# Patient Record
Sex: MALE | Race: OTHER | HISPANIC OR LATINO | Employment: OTHER | URBAN - METROPOLITAN AREA
[De-identification: names, ages, dates, MRNs, and addresses within clinical notes are randomized per-mention and may not be internally consistent; named-entity substitution may affect disease eponyms.]

---

## 2022-04-10 ENCOUNTER — HISTORICAL (OUTPATIENT)
Dept: ADMINISTRATIVE | Facility: HOSPITAL | Age: 22
End: 2022-04-10

## 2022-04-25 VITALS
WEIGHT: 315 LBS | SYSTOLIC BLOOD PRESSURE: 146 MMHG | DIASTOLIC BLOOD PRESSURE: 80 MMHG | BODY MASS INDEX: 41.75 KG/M2 | HEIGHT: 73 IN

## 2022-08-01 VITALS
SYSTOLIC BLOOD PRESSURE: 152 MMHG | RESPIRATION RATE: 20 BRPM | OXYGEN SATURATION: 96 % | DIASTOLIC BLOOD PRESSURE: 88 MMHG | BODY MASS INDEX: 34.65 KG/M2 | WEIGHT: 270 LBS | HEIGHT: 74 IN | HEART RATE: 105 BPM

## 2022-08-01 PROCEDURE — 93005 ELECTROCARDIOGRAM TRACING: CPT

## 2022-08-01 PROCEDURE — 99285 EMERGENCY DEPT VISIT HI MDM: CPT | Mod: 25

## 2022-08-01 RX ORDER — NAPROXEN SODIUM 220 MG/1
324 TABLET, FILM COATED ORAL
Status: COMPLETED | OUTPATIENT
Start: 2022-08-02 | End: 2022-08-02

## 2022-08-02 ENCOUNTER — HOSPITAL ENCOUNTER (EMERGENCY)
Facility: HOSPITAL | Age: 22
Discharge: HOME | End: 2022-08-02
Attending: EMERGENCY MEDICINE
Payer: MEDICAID

## 2022-08-02 DIAGNOSIS — E86.0 DEHYDRATION: Primary | ICD-10-CM

## 2022-08-02 DIAGNOSIS — R55 NEAR SYNCOPE: ICD-10-CM

## 2022-08-02 DIAGNOSIS — R42 LIGHTHEADEDNESS: ICD-10-CM

## 2022-08-02 DIAGNOSIS — R07.9 CHEST PAIN: ICD-10-CM

## 2022-08-02 DIAGNOSIS — I10 HYPERTENSION, UNSPECIFIED TYPE: ICD-10-CM

## 2022-08-02 LAB
ALBUMIN SERPL-MCNC: 4.3 GM/DL (ref 3.5–5)
ALBUMIN/GLOB SERPL: 1.1 RATIO (ref 1.1–2)
ALP SERPL-CCNC: 137 UNIT/L (ref 40–150)
ALT SERPL-CCNC: 154 UNIT/L (ref 0–55)
AST SERPL-CCNC: 60 UNIT/L (ref 5–34)
BASOPHILS # BLD AUTO: 0.06 X10(3)/MCL (ref 0–0.2)
BASOPHILS NFR BLD AUTO: 0.5 %
BILIRUBIN DIRECT+TOT PNL SERPL-MCNC: 0.8 MG/DL
BNP BLD-MCNC: 10.4 PG/ML
BUN SERPL-MCNC: 13 MG/DL (ref 8.9–20.6)
CALCIUM SERPL-MCNC: 9.5 MG/DL (ref 8.4–10.2)
CHLORIDE SERPL-SCNC: 103 MMOL/L (ref 98–107)
CO2 SERPL-SCNC: 22 MMOL/L (ref 22–29)
CREAT SERPL-MCNC: 1.02 MG/DL (ref 0.73–1.18)
EOSINOPHIL # BLD AUTO: 0.1 X10(3)/MCL (ref 0–0.9)
EOSINOPHIL NFR BLD AUTO: 0.9 %
ERYTHROCYTE [DISTWIDTH] IN BLOOD BY AUTOMATED COUNT: 12.8 % (ref 11.5–17)
GLOBULIN SER-MCNC: 3.9 GM/DL (ref 2.4–3.5)
GLUCOSE SERPL-MCNC: 406 MG/DL (ref 74–100)
HCT VFR BLD AUTO: 46.8 % (ref 42–52)
HGB BLD-MCNC: 15.8 GM/DL (ref 14–18)
IMM GRANULOCYTES # BLD AUTO: 0.05 X10(3)/MCL (ref 0–0.04)
IMM GRANULOCYTES NFR BLD AUTO: 0.5 %
INR BLD: 1.1 (ref 0–1.3)
LYMPHOCYTES # BLD AUTO: 3.41 X10(3)/MCL (ref 0.6–4.6)
LYMPHOCYTES NFR BLD AUTO: 30.7 %
MCH RBC QN AUTO: 28.5 PG (ref 27–31)
MCHC RBC AUTO-ENTMCNC: 33.8 MG/DL (ref 33–36)
MCV RBC AUTO: 84.5 FL (ref 80–94)
MONOCYTES # BLD AUTO: 0.58 X10(3)/MCL (ref 0.1–1.3)
MONOCYTES NFR BLD AUTO: 5.2 %
NEUTROPHILS # BLD AUTO: 6.9 X10(3)/MCL (ref 2.1–9.2)
NEUTROPHILS NFR BLD AUTO: 62.2 %
NRBC BLD AUTO-RTO: 0 %
PLATELET # BLD AUTO: 239 X10(3)/MCL (ref 130–400)
PMV BLD AUTO: 11.8 FL (ref 7.4–10.4)
POCT GLUCOSE: 328 MG/DL (ref 70–110)
POTASSIUM SERPL-SCNC: 3.7 MMOL/L (ref 3.5–5.1)
PROT SERPL-MCNC: 8.2 GM/DL (ref 6.4–8.3)
PROTHROMBIN TIME: 14.1 SECONDS (ref 12.5–14.5)
RBC # BLD AUTO: 5.54 X10(6)/MCL (ref 4.7–6.1)
SODIUM SERPL-SCNC: 137 MMOL/L (ref 136–145)
TROPONIN I SERPL-MCNC: <0.01 NG/ML (ref 0–0.04)
WBC # SPEC AUTO: 11.1 X10(3)/MCL (ref 4.5–11.5)

## 2022-08-02 PROCEDURE — 25000003 PHARM REV CODE 250: Performed by: EMERGENCY MEDICINE

## 2022-08-02 PROCEDURE — 85025 COMPLETE CBC W/AUTO DIFF WBC: CPT | Performed by: EMERGENCY MEDICINE

## 2022-08-02 PROCEDURE — 36415 COLL VENOUS BLD VENIPUNCTURE: CPT | Performed by: EMERGENCY MEDICINE

## 2022-08-02 PROCEDURE — 85610 PROTHROMBIN TIME: CPT | Performed by: EMERGENCY MEDICINE

## 2022-08-02 PROCEDURE — 80053 COMPREHEN METABOLIC PANEL: CPT | Performed by: EMERGENCY MEDICINE

## 2022-08-02 PROCEDURE — 83880 ASSAY OF NATRIURETIC PEPTIDE: CPT | Performed by: EMERGENCY MEDICINE

## 2022-08-02 PROCEDURE — 84484 ASSAY OF TROPONIN QUANT: CPT | Performed by: EMERGENCY MEDICINE

## 2022-08-02 RX ORDER — INSULIN PUMP SYRINGE, 3 ML
EACH MISCELLANEOUS
Qty: 1 EACH | Refills: 0 | Status: SHIPPED | OUTPATIENT
Start: 2022-08-02 | End: 2023-08-02

## 2022-08-02 RX ORDER — LANCETS
EACH MISCELLANEOUS
Qty: 100 EACH | Refills: 0 | Status: SHIPPED | OUTPATIENT
Start: 2022-08-02

## 2022-08-02 RX ORDER — METFORMIN HYDROCHLORIDE 500 MG/1
1000 TABLET ORAL 2 TIMES DAILY WITH MEALS
Qty: 120 TABLET | Refills: 0 | Status: SHIPPED | OUTPATIENT
Start: 2022-08-02 | End: 2022-09-01

## 2022-08-02 RX ADMIN — SODIUM CHLORIDE 1000 ML: 9 INJECTION, SOLUTION INTRAVENOUS at 02:08

## 2022-08-02 RX ADMIN — Medication 324 MG: at 12:08

## 2022-08-02 NOTE — ED NOTES
Pt arrived aasi with c/o dizziness. Pt states he was putting a clear coat on a table and was not wearing a mask. Pt started moving chicken food and became nauseated. Hx of DM and seizures. Pt currently cbg 328. nad noted. Aaox4.

## 2022-08-02 NOTE — ED PROVIDER NOTES
Encounter Date: 8/1/2022       History     Chief Complaint   Patient presents with    Dizziness     Pt arrives via [facility], EMS reports that the pt was clear coating a table without a mask, pt started moving chicken feed and felt dizzy. Pt nose began to bleed and he became nauseated. Pt reports that he has a hx of DM and seizures, pt does not take the medications that he is rx'd for these issues. Pt is hyperglycemic.      21 yo  M with h/o DM here for evaluation of weakness and syncope. He was working outside, had a beer and bent over when he suddenly felt weak, near syncopal and lightheaded.   No chest pain or shortness of breath. Has had this occur numerous times itn eh past. Is on metformin, unclear compliance, does not check cbg, last a1c 4 months ago. Believes he is on 500 bid metformin but having polydyspia and polyuria. No family history of sudden cardiac death or dysrhythmia    The history is provided by the patient. No  was used.   Dizziness  This is a recurrent problem. The current episode started less than 1 hour ago. The problem occurs rarely. The problem has been resolved. Pertinent negatives include no chest pain, no abdominal pain and no shortness of breath. Nothing aggravates the symptoms. Nothing relieves the symptoms. He has tried rest for the symptoms. The treatment provided significant relief.     Review of patient's allergies indicates:  No Known Allergies  No past medical history on file.  No past surgical history on file.  No family history on file.     Review of Systems   Constitutional: Positive for fatigue. Negative for activity change, diaphoresis and fever.   HENT: Negative for congestion, postnasal drip, rhinorrhea, sinus pain, sneezing and sore throat.    Respiratory: Negative for cough, chest tightness, shortness of breath and wheezing.    Cardiovascular: Negative for chest pain, palpitations and leg swelling.   Gastrointestinal: Negative for abdominal  distention, abdominal pain and blood in stool.   Endocrine: Positive for polydipsia and polyuria.   Genitourinary: Negative for decreased urine volume, difficulty urinating and dysuria.   Musculoskeletal: Negative.    Skin: Negative for color change and pallor.   Neurological: Positive for dizziness, syncope and weakness. Negative for speech difficulty, light-headedness and numbness.   All other systems reviewed and are negative.      Physical Exam     Initial Vitals [08/01/22 2349]   BP Pulse Resp Temp SpO2   (!) 152/88 105 20 -- 96 %      MAP       --         Physical Exam    Nursing note and vitals reviewed.  Constitutional: He appears well-developed and well-nourished. He is not diaphoretic. No distress.   HENT:   Head: Normocephalic and atraumatic.   Nose: Nose normal.   Mouth/Throat: Oropharynx is clear and moist.   Eyes: Conjunctivae and EOM are normal. Pupils are equal, round, and reactive to light.   Neck: Trachea normal. Neck supple.   Normal range of motion.  Cardiovascular: Normal rate, regular rhythm, normal heart sounds and intact distal pulses. Exam reveals no gallop and no friction rub.    No murmur heard.  Pulmonary/Chest: Breath sounds normal. No respiratory distress. He has no wheezes. He has no rhonchi. He has no rales. He exhibits no tenderness.   Abdominal: Abdomen is soft. Bowel sounds are normal. He exhibits no distension and no mass. There is no abdominal tenderness. There is no rebound.   Musculoskeletal:         General: No tenderness or edema. Normal range of motion.      Cervical back: Normal range of motion and neck supple.      Lumbar back: Normal. No tenderness. Normal range of motion.     Neurological: He is alert and oriented to person, place, and time. He has normal strength. No cranial nerve deficit or sensory deficit.   Skin: Skin is warm and dry. Capillary refill takes less than 2 seconds. No rash and no abscess noted. No erythema. No pallor.   Psychiatric: He has a normal mood  and affect. His behavior is normal. Judgment and thought content normal.         ED Course   Procedures  Labs Reviewed   COMPREHENSIVE METABOLIC PANEL - Abnormal; Notable for the following components:       Result Value    Glucose Level 406 (*)     Globulin 3.9 (*)     Alanine Aminotransferase 154 (*)     Aspartate Aminotransferase 60 (*)     All other components within normal limits   CBC WITH DIFFERENTIAL - Abnormal; Notable for the following components:    MPV 11.8 (*)     IG# 0.05 (*)     All other components within normal limits   POCT GLUCOSE - Abnormal; Notable for the following components:    POCT Glucose 328 (*)     All other components within normal limits   B-TYPE NATRIURETIC PEPTIDE - Normal   TROPONIN I - Normal   PROTIME-INR - Normal   CBC W/ AUTO DIFFERENTIAL    Narrative:     The following orders were created for panel order CBC auto differential.  Procedure                               Abnormality         Status                     ---------                               -----------         ------                     CBC with Differential[[id number]]        Abnormal            Final result                 Please view results for these tests on the individual orders.   POCT GLUCOSE, HAND-HELD DEVICE     EKG Readings: (Independently Interpreted)   Rhythm: Sinus Tachycardia. Heart Rate: 104. Ectopy: No Ectopy. Conduction: Normal. ST Segments: Normal ST Segments. T Waves: Normal.   2350 8/1/22  Sinus tachycardia with poor r wave progression       Imaging Results          X-Ray Chest 1 View (Preliminary result)  Result time 08/02/22 03:23:47    ED Interpretation by Samantha Gupta MD (08/02/22 03:23:47, [facility] - Emergency Dept, Emergency Medicine)    No acute cardiopulmonary abnormality noted                               Medications   sodium chloride 0.9% bolus 1,000 mL (1,000 mLs Intravenous New Bag 8/2/22 0245)   aspirin chewable tablet 324 mg (324 mg Oral Given 8/2/22 0000)      Medical Decision Making:   History:   Old Medical Records: I decided to obtain old medical records.  Old Records Summarized: records from previous admission(s).       <> Summary of Records: Previous a1c during a prior admit was 10  Initial Assessment:   Syncope outside in the heat  Differential Diagnosis:   Hever, hyperglycemia, dehydration, dysrhythmia, ivvd  Independently Interpreted Test(s):   I have ordered and independently interpreted EKG Reading(s) - see prior notes  Clinical Tests:   Lab Tests: Reviewed  Radiological Study: Reviewed  Medical Tests: Reviewed  ED Management:  Clearly glucose conrol is less than idea, given ivf, willi ncrease metformin, instructed on cbg monitoring and clsoe f/u, ensuring adequate hydration  I think arrhythmia is unlikely. EKG shows normal sinus rhythm with no interval abnormalities such as QT prolongation or WPW. There are no findings to suggest Brugada syndrome. Cardiac monitoring in the emergency department reveals no tachycardic or bradycardic dysrhythmia. Hypertrophic cardiomyopathy was considered, but there are no clear historical elements pointing towards this. EKG is not suggestive. The QRS voltage is not extremely large and there are no suggestive Q waves               ED Course as of 08/02/22 0335   Tue Aug 02, 2022   0334 Feeling much better, comfortable with dc [BS]      ED Course User Index  [BS] Samantha Gupta MD             Clinical Impression:   Final diagnoses:  [R07.9] Chest pain  [E86.0] Dehydration (Primary)  [R42] Lightheadedness  [R55] Near syncope  [I10] Hypertension, unspecified type          ED Disposition Condition    Discharge Stable        ED Prescriptions     Medication Sig Dispense Start Date End Date Auth. Provider    metFORMIN (GLUCOPHAGE) 500 MG tablet Take 2 tablets (1,000 mg total) by mouth 2 (two) times daily with meals. 120 tablet 8/2/2022 9/1/2022 Samantha Gupta MD    blood-glucose meter kit To check BG 2 times daily, to use with  insurance preferred meter 1 each 8/2/2022 8/2/2023 Samantha Gupta MD    lancets Misc To check BG 2 times daily, to use with insurance preferred meter 100 each 8/2/2022  Samantha Gupta MD    blood sugar diagnostic Strp To check BG 2 times daily, to use with insurance preferred meter 70 each 8/2/2022  Samantha Gupta MD        Follow-up Information     Follow up With Specialties Details Why Contact Info    your primary care provider  Call today      [facility] - Emergency Dept Emergency Medicine  As needed, If symptoms worsen [address] [facility] [address]  [phone]           Samantha Gupta MD  08/02/22 7359

## 2023-12-14 ENCOUNTER — HOSPITAL ENCOUNTER (EMERGENCY)
Facility: HOSPITAL | Age: 23
Discharge: HOME | End: 2023-12-14
Attending: INTERNAL MEDICINE
Payer: MEDICAID

## 2023-12-14 VITALS
WEIGHT: 308.63 LBS | RESPIRATION RATE: 17 BRPM | HEART RATE: 87 BPM | TEMPERATURE: 99 F | HEIGHT: 74 IN | SYSTOLIC BLOOD PRESSURE: 153 MMHG | BODY MASS INDEX: 39.61 KG/M2 | DIASTOLIC BLOOD PRESSURE: 79 MMHG | OXYGEN SATURATION: 98 %

## 2023-12-14 DIAGNOSIS — K11.20 SIALADENITIS: Primary | ICD-10-CM

## 2023-12-14 LAB
ALBUMIN SERPL-MCNC: 4 G/DL (ref 3.5–5)
ALBUMIN/GLOB SERPL: 1.1 RATIO (ref 1.1–2)
ALP SERPL-CCNC: 108 UNIT/L (ref 40–150)
ALT SERPL-CCNC: 68 UNIT/L (ref 0–55)
AST SERPL-CCNC: 35 UNIT/L (ref 5–34)
BASOPHILS # BLD AUTO: 0.04 X10(3)/MCL
BASOPHILS NFR BLD AUTO: 0.5 %
BILIRUB SERPL-MCNC: 0.6 MG/DL
BUN SERPL-MCNC: 9.9 MG/DL (ref 8.9–20.6)
CALCIUM SERPL-MCNC: 9.2 MG/DL (ref 8.4–10.2)
CHLORIDE SERPL-SCNC: 105 MMOL/L (ref 98–107)
CO2 SERPL-SCNC: 25 MMOL/L (ref 22–29)
CREAT SERPL-MCNC: 0.95 MG/DL (ref 0.73–1.18)
EOSINOPHIL # BLD AUTO: 0.22 X10(3)/MCL (ref 0–0.9)
EOSINOPHIL NFR BLD AUTO: 2.5 %
ERYTHROCYTE [DISTWIDTH] IN BLOOD BY AUTOMATED COUNT: 12.8 % (ref 11.5–17)
GFR SERPLBLD CREATININE-BSD FMLA CKD-EPI: >60 MLS/MIN/1.73/M2
GLOBULIN SER-MCNC: 3.7 GM/DL (ref 2.4–3.5)
GLUCOSE SERPL-MCNC: 124 MG/DL (ref 74–100)
HCT VFR BLD AUTO: 44.8 % (ref 42–52)
HGB BLD-MCNC: 14.6 G/DL (ref 14–18)
IMM GRANULOCYTES # BLD AUTO: 0.01 X10(3)/MCL (ref 0–0.04)
IMM GRANULOCYTES NFR BLD AUTO: 0.1 %
LYMPHOCYTES # BLD AUTO: 2.71 X10(3)/MCL (ref 0.6–4.6)
LYMPHOCYTES NFR BLD AUTO: 31.1 %
MCH RBC QN AUTO: 27.7 PG (ref 27–31)
MCHC RBC AUTO-ENTMCNC: 32.6 G/DL (ref 33–36)
MCV RBC AUTO: 85 FL (ref 80–94)
MONOCYTES # BLD AUTO: 0.8 X10(3)/MCL (ref 0.1–1.3)
MONOCYTES NFR BLD AUTO: 9.2 %
NEUTROPHILS # BLD AUTO: 4.92 X10(3)/MCL (ref 2.1–9.2)
NEUTROPHILS NFR BLD AUTO: 56.6 %
NRBC BLD AUTO-RTO: 0 %
PLATELET # BLD AUTO: 202 X10(3)/MCL (ref 130–400)
PMV BLD AUTO: 10.7 FL (ref 7.4–10.4)
POTASSIUM SERPL-SCNC: 4 MMOL/L (ref 3.5–5.1)
PROT SERPL-MCNC: 7.7 GM/DL (ref 6.4–8.3)
RBC # BLD AUTO: 5.27 X10(6)/MCL (ref 4.7–6.1)
SODIUM SERPL-SCNC: 140 MMOL/L (ref 136–145)
WBC # SPEC AUTO: 8.7 X10(3)/MCL (ref 4.5–11.5)

## 2023-12-14 PROCEDURE — 25500020 PHARM REV CODE 255: Performed by: INTERNAL MEDICINE

## 2023-12-14 PROCEDURE — 80053 COMPREHEN METABOLIC PANEL: CPT | Performed by: INTERNAL MEDICINE

## 2023-12-14 PROCEDURE — 85025 COMPLETE CBC W/AUTO DIFF WBC: CPT | Performed by: INTERNAL MEDICINE

## 2023-12-14 PROCEDURE — 63600175 PHARM REV CODE 636 W HCPCS: Performed by: INTERNAL MEDICINE

## 2023-12-14 PROCEDURE — 99285 EMERGENCY DEPT VISIT HI MDM: CPT | Mod: 25

## 2023-12-14 PROCEDURE — 96374 THER/PROPH/DIAG INJ IV PUSH: CPT

## 2023-12-14 RX ORDER — DICLOFENAC SODIUM 50 MG/1
50 TABLET, DELAYED RELEASE ORAL 2 TIMES DAILY PRN
Qty: 20 TABLET | Refills: 0 | Status: SHIPPED | OUTPATIENT
Start: 2023-12-14

## 2023-12-14 RX ORDER — KETOROLAC TROMETHAMINE 30 MG/ML
15 INJECTION, SOLUTION INTRAMUSCULAR; INTRAVENOUS ONCE
Status: COMPLETED | OUTPATIENT
Start: 2023-12-14 | End: 2023-12-14

## 2023-12-14 RX ORDER — AMOXICILLIN AND CLAVULANATE POTASSIUM 875; 125 MG/1; MG/1
1 TABLET, FILM COATED ORAL 2 TIMES DAILY
Qty: 14 TABLET | Refills: 0 | Status: SHIPPED | OUTPATIENT
Start: 2023-12-14

## 2023-12-14 RX ADMIN — IOHEXOL 100 ML: 350 INJECTION, SOLUTION INTRAVENOUS at 02:12

## 2023-12-14 RX ADMIN — KETOROLAC TROMETHAMINE 15 MG: 30 INJECTION, SOLUTION INTRAMUSCULAR; INTRAVENOUS at 02:12

## 2023-12-14 NOTE — ED PROVIDER NOTES
Encounter Date: 12/14/2023       History     Chief Complaint   Patient presents with    Facial Pain     Pt. States lower left sided pain as well as a painful knot. States his teeth hurt and his face is numb     Presents with left neck area pain radiating to his teeth. Hx of seizures, last event on 12/7/23, states have not filled his prescription.    The history is provided by the patient and a relative.     Review of patient's allergies indicates:  No Known Allergies  Past Medical History:   Diagnosis Date    Mild intermittent asthma, uncomplicated     Seizure      History reviewed. No pertinent surgical history.  History reviewed. No pertinent family history.  Social History     Tobacco Use    Smoking status: Never    Smokeless tobacco: Never   Substance Use Topics    Alcohol use: Not Currently    Drug use: Not Currently     Review of Systems   Constitutional:  Negative for fever.   HENT:  Positive for dental problem. Negative for drooling, sore throat, trouble swallowing and voice change.    Respiratory:  Negative for shortness of breath.    Cardiovascular:  Negative for chest pain.   Gastrointestinal:  Negative for nausea.   Genitourinary:  Negative for dysuria.   Musculoskeletal:  Negative for back pain.   Skin:  Negative for rash.   Neurological:  Positive for numbness and headaches. Negative for weakness.   Hematological:  Does not bruise/bleed easily.   All other systems reviewed and are negative.      Physical Exam     Initial Vitals [12/14/23 0122]   BP Pulse Resp Temp SpO2   (!) 151/88 98 18 98.7 °F (37.1 °C) 98 %      MAP       --         Physical Exam    Nursing note and vitals reviewed.  Constitutional: He appears well-developed and well-nourished. No distress.   HENT:   Head: Normocephalic and atraumatic.   Right Ear: External ear normal.   Left Ear: External ear normal.   Nose: Nose normal.   Mouth/Throat: Oropharynx is clear and moist. No oropharyngeal exudate.   Tenderness among left submandibular  area   Eyes: Conjunctivae and EOM are normal. Pupils are equal, round, and reactive to light.   Neck: Neck supple. No JVD present.   Normal range of motion.  Cardiovascular:  Regular rhythm, normal heart sounds and intact distal pulses.           Pulmonary/Chest: Breath sounds normal. No respiratory distress.   Abdominal: Abdomen is soft. Bowel sounds are normal. He exhibits no distension. There is no abdominal tenderness. There is no rebound and no guarding.   Musculoskeletal:         General: No edema. Normal range of motion.      Cervical back: Normal range of motion and neck supple.     Neurological: He is alert and oriented to person, place, and time. He has normal strength. GCS score is 15. GCS eye subscore is 4. GCS verbal subscore is 5. GCS motor subscore is 6.   Skin: Skin is warm and dry. No rash noted.   Psychiatric: His behavior is normal.         ED Course   Procedures  Labs Reviewed   COMPREHENSIVE METABOLIC PANEL - Abnormal; Notable for the following components:       Result Value    Glucose Level 124 (*)     Globulin 3.7 (*)     Alanine Aminotransferase 68 (*)     Aspartate Aminotransferase 35 (*)     All other components within normal limits   CBC WITH DIFFERENTIAL - Abnormal; Notable for the following components:    MCHC 32.6 (*)     MPV 10.7 (*)     All other components within normal limits   CBC W/ AUTO DIFFERENTIAL    Narrative:     The following orders were created for panel order CBC auto differential.  Procedure                               Abnormality         Status                     ---------                               -----------         ------                     CBC with Differential[[id number]]        Abnormal            Final result                 Please view results for these tests on the individual orders.   EXTRA TUBES    Narrative:     The following orders were created for panel order EXTRA TUBES.  Procedure                               Abnormality         Status                      ---------                               -----------         ------                     Gold Top Hold[[id number]]                                                                 Please view results for these tests on the individual orders.   GOLD TOP HOLD          Imaging Results              CT Soft Tissue Neck With Contrast (Preliminary result)  Result time 12/14/23 03:39:15      Preliminary result by aLvon Giang Jr., MD (12/14/23 03:39:15)                   Narrative:    START OF REPORT:  Technique: CT of the soft tissues of the neck was performed with intravenous contrast with direct axial as well as sagittal and coronal reformations.    Comparison: None.    Clinical history: Facial Pain (Pt. States lower left sided pain as well as a painful knot. States his teeth hurt and his face is numb.    Findings:  Sinuses: The visualized paranasal sinuses are clear.  Nasopharynx: Unremarkable.  Oropharynx: Unremarkable.  Larynx: Unremarkable.  Trachea: Unremarkable.  Esophagus: Unremarkable.  Vascular structures: Unremarkable.  Thyroid glands: Unremarkable. Bilateral parotid and right submandibular glands appear unremarkable. No evidence of duct dilatation or sialolith. The left submandibular gland appears mildly edematous. There is fat stranding surrounding the left submandibular gland (series 3, image 50) and an element of sialadenitis cannot be excluded.  Lymph nodes: There are multiple enlarged left cervical lymph nodes at level I, II, III and IV, the largest measures approximately 2.5 cm in least dimension at level I (series 3, image 45).    Bony structures: The osseous structures appear unremarkable.      Impression:  1. There are multiple enlarged left cervical lymph nodes at level I, II, III and IV, the largest measures approximately 2.5 cm in least dimension at level I (series 3, image 45).  2. The left submandibular gland appears mildly edematous. There is fat stranding surrounding the left  submandibular gland (series 3, image 50) and an element of sialadenitis cannot be excluded.  3. Details and other findings as described above.                                         Medications   ketorolac injection 15 mg (15 mg Intravenous Given 12/14/23 0213)   iohexoL (OMNIPAQUE 350) injection 100 mL (100 mLs Intravenous Given 12/14/23 0259)     Medical Decision Making  Amount and/or Complexity of Data Reviewed  Labs: ordered. Decision-making details documented in ED Course.  Radiology: ordered and independent interpretation performed. Decision-making details documented in ED Course.    Risk  Prescription drug management.      Additional MDM:   Differential Diagnosis:   URI, COVID, Flu, Strep Throat, Mononucleosis, Viral pharyngitis, HIV, Malignancy, epiglottitis, peritonsillar abscess, MI, among others                                      Clinical Impression:  Final diagnoses:  [K11.20] Sialadenitis (Primary)          ED Disposition Condition    Discharge Stable          ED Prescriptions       Medication Sig Dispense Start Date End Date Auth. Provider    amoxicillin-clavulanate 875-125mg (AUGMENTIN) 875-125 mg per tablet Take 1 tablet by mouth 2 (two) times daily. 14 tablet 12/14/2023 -- Ward Hurley MD    diclofenac (VOLTAREN) 50 MG EC tablet Take 1 tablet (50 mg total) by mouth 2 (two) times daily as needed (Pain). 20 tablet 12/14/2023 -- Ward Hurley MD          Follow-up Information       Follow up With Specialties Details Why Contact Info    [facility] - Emergency Dept Emergency Medicine  If symptoms worsen [facility] [address] [phone]    [facility]  Schedule an appointment as soon as possible for a visit in 2 months  [facility] [address]             Ward Hurley MD  12/14/23 3306

## 2025-03-13 ENCOUNTER — HOSPITAL ENCOUNTER (EMERGENCY)
Facility: HOSPITAL | Age: 25
Discharge: HOME | End: 2025-03-13
Attending: INTERNAL MEDICINE
Payer: MEDICAID

## 2025-03-13 VITALS
HEART RATE: 87 BPM | HEIGHT: 74 IN | BODY MASS INDEX: 34.65 KG/M2 | OXYGEN SATURATION: 97 % | RESPIRATION RATE: 20 BRPM | SYSTOLIC BLOOD PRESSURE: 134 MMHG | WEIGHT: 270 LBS | DIASTOLIC BLOOD PRESSURE: 62 MMHG | TEMPERATURE: 98 F

## 2025-03-13 DIAGNOSIS — R56.9 SEIZURE: Primary | ICD-10-CM

## 2025-03-13 DIAGNOSIS — I10 HYPERTENSION, UNSPECIFIED TYPE: ICD-10-CM

## 2025-03-13 DIAGNOSIS — E11.65 UNCONTROLLED TYPE 2 DIABETES MELLITUS WITH HYPERGLYCEMIA: ICD-10-CM

## 2025-03-13 LAB
ALBUMIN SERPL-MCNC: 4.2 G/DL (ref 3.5–5)
ALBUMIN/GLOB SERPL: 1 RATIO (ref 1.1–2)
ALLENS TEST: ABNORMAL
ALP SERPL-CCNC: 121 UNIT/L (ref 40–150)
ALT SERPL-CCNC: 66 UNIT/L (ref 0–55)
ANION GAP SERPL CALC-SCNC: 20 MEQ/L
AST SERPL-CCNC: 32 UNIT/L (ref 5–34)
BASOPHILS # BLD AUTO: 0.1 X10(3)/MCL
BASOPHILS NFR BLD AUTO: 0.6 %
BILIRUB SERPL-MCNC: 0.7 MG/DL
BUN SERPL-MCNC: 15 MG/DL (ref 8.9–20.6)
CALCIUM SERPL-MCNC: 9.3 MG/DL (ref 8.4–10.2)
CHLORIDE SERPL-SCNC: 101 MMOL/L (ref 98–107)
CO2 SERPL-SCNC: 12 MMOL/L (ref 22–29)
CREAT SERPL-MCNC: 1.15 MG/DL (ref 0.72–1.25)
CREAT/UREA NIT SERPL: 13
DELSYS: ABNORMAL
EOSINOPHIL # BLD AUTO: 0.4 X10(3)/MCL (ref 0–0.9)
EOSINOPHIL NFR BLD AUTO: 2.5 %
ERYTHROCYTE [DISTWIDTH] IN BLOOD BY AUTOMATED COUNT: 12.6 % (ref 11.5–17)
ETHANOL SERPL-MCNC: <10 MG/DL
GFR SERPLBLD CREATININE-BSD FMLA CKD-EPI: >60 ML/MIN/1.73/M2
GLOBULIN SER-MCNC: 4.1 GM/DL (ref 2.4–3.5)
GLUCOSE SERPL-MCNC: 425 MG/DL (ref 74–100)
HCO3 UR-SCNC: 20.6 MMOL/L (ref 24–28)
HCT VFR BLD AUTO: 49 % (ref 42–52)
HGB BLD-MCNC: 16.6 G/DL (ref 14–18)
IMM GRANULOCYTES # BLD AUTO: 0.07 X10(3)/MCL (ref 0–0.04)
IMM GRANULOCYTES NFR BLD AUTO: 0.4 %
LYMPHOCYTES # BLD AUTO: 6.7 X10(3)/MCL (ref 0.6–4.6)
LYMPHOCYTES NFR BLD AUTO: 41.1 %
MCH RBC QN AUTO: 28.7 PG (ref 27–31)
MCHC RBC AUTO-ENTMCNC: 33.9 G/DL (ref 33–36)
MCV RBC AUTO: 84.6 FL (ref 80–94)
MONOCYTES # BLD AUTO: 0.87 X10(3)/MCL (ref 0.1–1.3)
MONOCYTES NFR BLD AUTO: 5.3 %
NEUTROPHILS # BLD AUTO: 8.15 X10(3)/MCL (ref 2.1–9.2)
NEUTROPHILS NFR BLD AUTO: 50.1 %
NRBC BLD AUTO-RTO: 0 %
PCO2 BLDA: 37.8 MMHG (ref 35–45)
PH SMN: 7.34 [PH] (ref 7.35–7.45)
PLATELET # BLD AUTO: 360 X10(3)/MCL (ref 130–400)
PMV BLD AUTO: 10.9 FL (ref 7.4–10.4)
PO2 BLDA: 84 MMHG (ref 80–100)
POC BE: -5 MMOL/L
POC SATURATED O2: 96 % (ref 95–100)
POC TCO2: 22 MMOL/L (ref 23–27)
POCT GLUCOSE: 349 MG/DL (ref 70–110)
POCT GLUCOSE: 368 MG/DL (ref 70–110)
POCT GLUCOSE: 401 MG/DL (ref 70–110)
POTASSIUM SERPL-SCNC: 4.1 MMOL/L (ref 3.5–5.1)
PROT SERPL-MCNC: 8.3 GM/DL (ref 6.4–8.3)
RBC # BLD AUTO: 5.79 X10(6)/MCL (ref 4.7–6.1)
SAMPLE: ABNORMAL
SITE: ABNORMAL
SODIUM SERPL-SCNC: 133 MMOL/L (ref 136–145)
WBC # BLD AUTO: 16.29 X10(3)/MCL (ref 4.5–11.5)

## 2025-03-13 PROCEDURE — 96360 HYDRATION IV INFUSION INIT: CPT | Mod: 59

## 2025-03-13 PROCEDURE — 99291 CRITICAL CARE FIRST HOUR: CPT

## 2025-03-13 PROCEDURE — 80053 COMPREHEN METABOLIC PANEL: CPT | Performed by: INTERNAL MEDICINE

## 2025-03-13 PROCEDURE — 82803 BLOOD GASES ANY COMBINATION: CPT

## 2025-03-13 PROCEDURE — 63600175 PHARM REV CODE 636 W HCPCS: Performed by: INTERNAL MEDICINE

## 2025-03-13 PROCEDURE — 99900035 HC TECH TIME PER 15 MIN (STAT)

## 2025-03-13 PROCEDURE — 25000003 PHARM REV CODE 250: Performed by: INTERNAL MEDICINE

## 2025-03-13 PROCEDURE — 82077 ASSAY SPEC XCP UR&BREATH IA: CPT | Performed by: INTERNAL MEDICINE

## 2025-03-13 PROCEDURE — 85025 COMPLETE CBC W/AUTO DIFF WBC: CPT | Performed by: INTERNAL MEDICINE

## 2025-03-13 PROCEDURE — 96374 THER/PROPH/DIAG INJ IV PUSH: CPT

## 2025-03-13 PROCEDURE — 96372 THER/PROPH/DIAG INJ SC/IM: CPT | Performed by: INTERNAL MEDICINE

## 2025-03-13 PROCEDURE — 96361 HYDRATE IV INFUSION ADD-ON: CPT

## 2025-03-13 PROCEDURE — 36600 WITHDRAWAL OF ARTERIAL BLOOD: CPT

## 2025-03-13 PROCEDURE — 82962 GLUCOSE BLOOD TEST: CPT

## 2025-03-13 RX ORDER — SODIUM CHLORIDE 9 MG/ML
1000 INJECTION, SOLUTION INTRAVENOUS
Status: COMPLETED | OUTPATIENT
Start: 2025-03-13 | End: 2025-03-13

## 2025-03-13 RX ORDER — LEVETIRACETAM 500 MG/5ML
1500 INJECTION, SOLUTION, CONCENTRATE INTRAVENOUS
Status: COMPLETED | OUTPATIENT
Start: 2025-03-13 | End: 2025-03-13

## 2025-03-13 RX ORDER — ACETAMINOPHEN 500 MG
1000 TABLET ORAL
Status: COMPLETED | OUTPATIENT
Start: 2025-03-13 | End: 2025-03-13

## 2025-03-13 RX ADMIN — ACETAMINOPHEN 1000 MG: 500 TABLET, FILM COATED ORAL at 09:03

## 2025-03-13 RX ADMIN — SODIUM CHLORIDE 1000 ML: 9 INJECTION, SOLUTION INTRAVENOUS at 09:03

## 2025-03-13 RX ADMIN — HUMAN INSULIN 5 UNITS: 100 INJECTION, SOLUTION SUBCUTANEOUS at 09:03

## 2025-03-13 RX ADMIN — SODIUM CHLORIDE, POTASSIUM CHLORIDE, SODIUM LACTATE AND CALCIUM CHLORIDE 3675 ML: 600; 310; 30; 20 INJECTION, SOLUTION INTRAVENOUS at 10:03

## 2025-03-13 RX ADMIN — LEVETIRACETAM 1500 MG: 100 INJECTION, SOLUTION INTRAVENOUS at 09:03

## 2025-03-13 NOTE — ED PROVIDER NOTES
Encounter Date: 3/13/2025  History from patient and father     History     Chief Complaint   Patient presents with    Shortness of Breath     C/o SOB, doesn't remember exactly what happened     HPI    Alvarez Torres is 24 y.o. male who  has a past medical history of Mild intermittent asthma, uncomplicated and Seizure. arrives in ER with c/o Shortness of Breath (C/o SOB, doesn't remember exactly what happened)    Review of patient's allergies indicates:  No Known Allergies  Past Medical History:   Diagnosis Date    Mild intermittent asthma, uncomplicated     Seizure      History reviewed. No pertinent surgical history.  No family history on file.  Social History[1]  Review of Systems   Constitutional:  Negative for fever.   HENT:  Negative for trouble swallowing and voice change.    Eyes:  Negative for visual disturbance.   Respiratory:  Negative for cough and shortness of breath.    Cardiovascular:  Negative for chest pain.   Gastrointestinal:  Negative for abdominal pain, diarrhea and vomiting.   Genitourinary:  Negative for dysuria and hematuria.   Musculoskeletal:  Negative for back pain and gait problem.   Skin:  Negative for color change and rash.   Neurological:  Positive for seizures. Negative for headaches.   Psychiatric/Behavioral:  Negative for behavioral problems and sleep disturbance.    All other systems reviewed and are negative.      Physical Exam     Initial Vitals [03/13/25 0849]   BP Pulse Resp Temp SpO2   (!) 185/81 (!) 119 20 98 °F (36.7 °C) 96 %      MAP       --         Physical Exam    Nursing note and vitals reviewed.  Constitutional: He appears well-developed and well-nourished. No distress.   HENT:   Head: Atraumatic.   Eyes: EOM are normal.   Neck: Neck supple.   Cardiovascular:  Normal heart sounds.           Tachycardia   Pulmonary/Chest: Breath sounds normal. No respiratory distress. He has no wheezes. He has no rales.   Abdominal: Abdomen is soft. Bowel sounds are normal.    Musculoskeletal:         General: Normal range of motion.      Cervical back: Neck supple. No bony tenderness.     Neurological: He is alert and oriented to person, place, and time. GCS score is 15. GCS eye subscore is 4. GCS verbal subscore is 5. GCS motor subscore is 6.   Speech Normal   Skin: Skin is dry.   Psychiatric: He has a normal mood and affect.   Pleasant         ED Course   Critical Care    Date/Time: 3/13/2025 12:23 PM    Performed by: Antionette Neil MD  Authorized by: Antionette Neil MD  Direct patient critical care time: 35 minutes  Total critical care time (exclusive of procedural time) : 35 minutes  Critical care was necessary to treat or prevent imminent or life-threatening deterioration of the following conditions: metabolic crisis and CNS failure or compromise.  Critical care was time spent personally by me on the following activities: development of treatment plan with patient or surrogate, evaluation of patient's response to treatment, examination of patient, obtaining history from patient or surrogate, ordering and performing treatments and interventions, ordering and review of laboratory studies, ordering and review of radiographic studies, pulse oximetry, re-evaluation of patient's condition and review of old charts.        Orders Placed This Encounter    Critical Care    CT Head Without Contrast    CBC auto differential    Comprehensive metabolic panel    Urinalysis, Reflex to Urine Culture    Ethanol    Drug Screen, Urine    CBC with Differential    POCT ARTERIAL BLOOD GAS Blood Gas    Insert Saline lock IV    POCT glucose    POCT glucose    ISTAT PROCEDURE    POCT glucose    0.9% NaCl infusion    levETIRAcetam injection 1,500 mg    insulin regular injection 5 Units 0.05 mL    acetaminophen tablet 1,000 mg    lactated ringers bolus 3,675 mL     Labs Reviewed   COMPREHENSIVE METABOLIC PANEL - Abnormal       Result Value    Sodium 133 (*)     Potassium 4.1      Chloride 101      CO2  12 (*)     Glucose 425 (*)     Blood Urea Nitrogen 15.0      Creatinine 1.15      Calcium 9.3      Protein Total 8.3      Albumin 4.2      Globulin 4.1 (*)     Albumin/Globulin Ratio 1.0 (*)     Bilirubin Total 0.7            ALT 66 (*)     AST 32      eGFR >60      Anion Gap 20.0      BUN/Creatinine Ratio 13     CBC WITH DIFFERENTIAL - Abnormal    WBC 16.29 (*)     RBC 5.79      Hgb 16.6      Hct 49.0      MCV 84.6      MCH 28.7      MCHC 33.9      RDW 12.6      Platelet 360      MPV 10.9 (*)     Neut % 50.1      Lymph % 41.1      Mono % 5.3      Eos % 2.5      Basophil % 0.6      Imm Grans % 0.4      Neut # 8.15      Lymph # 6.70 (*)     Mono # 0.87      Eos # 0.40      Baso # 0.10      Imm Gran # 0.07 (*)     NRBC% 0.0     POCT GLUCOSE - Abnormal    POCT Glucose 368 (*)    POCT GLUCOSE - Abnormal    POCT Glucose 401 (*)    ISTAT PROCEDURE - Abnormal    POC PH 7.345 (*)     POC PCO2 37.8      POC PO2 84      POC HCO3 20.6 (*)     POC BE -5 (*)     POC SATURATED O2 96      POC TCO2 22 (*)     Sample ARTERIAL      Site RR      Allens Test Pass      DelSys Room Air     POCT GLUCOSE - Abnormal    POCT Glucose 349 (*)    ALCOHOL,MEDICAL (ETHANOL) - Normal    Ethanol Level <10.0     CBC W/ AUTO DIFFERENTIAL    Narrative:     The following orders were created for panel order CBC auto differential.  Procedure                               Abnormality         Status                     ---------                               -----------         ------                     CBC with Differential[[id number]]       Abnormal            Final result                 Please view results for these tests on the individual orders.   URINALYSIS, REFLEX TO URINE CULTURE   DRUG SCREEN, URINE (BEAKER)        Imaging Results              CT Head Without Contrast (Final result)  Result time 03/13/25 10:05:46      Final result by Carlos Isbell MD (03/13/25 10:05:46)                   Impression:      1. No acute intracranial  abnormality identified  2. Findings and other details as above      Electronically signed by: Carlos Isbell  Date:    03/13/2025  Time:    10:05               Narrative:    EXAMINATION:  CT HEAD WITHOUT CONTRAST    CLINICAL HISTORY:  Recurrent seizures;, .    TECHNIQUE:  PATIENT RADIATION DOSE: DLP(mGycm) 941    As per PQRS measures, all CT scans at this facility used dose modulation, iterative reconstruction, and/or weight based dose adjustment when appropriate to reduce radiation dose to as low as reasonably achievable.    COMPARISON:  [facility]    FINDINGS:  Serial axial images were obtained of the head without the administration of IV contrast. Both brain and bone parenchymal windows were obtained.  Additional coronal and sagittal reconstructions were obtained.  Ventricles, cisterns, and sulci are within normal limits.  There is no evidence of intracranial hemorrhage, midline shift, mass effect, or abnormal extra-axial fluid collections.  Cerebellar tonsils extend caudally to the level of foramen magnum.  There is beam hardening artifact at the skull base.  There is mucosal thickening at the right sphenoid sinus.  Mastoid air cells are aerated bilaterally.  Filling defects noted to the left external auditory canal suggesting cerumen.  Clinical correlation is indicated.                                       Medications   0.9% NaCl infusion (1,000 mLs Intravenous New Bag 3/13/25 0911)   levETIRAcetam injection 1,500 mg (1,500 mg Intravenous Given 3/13/25 0914)   insulin regular injection 5 Units 0.05 mL (5 Units Subcutaneous Given 3/13/25 0936)   acetaminophen tablet 1,000 mg (1,000 mg Oral Given 3/13/25 0958)   lactated ringers bolus 3,675 mL (3,675 mLs Intravenous New Bag 3/13/25 1039)     Medical Decision Making    Alvarez Torres is 24 y.o. male who  has a past medical history of Mild intermittent asthma, uncomplicated and Seizure. arrives in ER with c/o Shortness of Breath (C/o SOB, doesn't remember  exactly what happened)    Patient is brought in by his father saying that he started seizing in the car while they were going to work.  Patient's father says that usually he is able to tell you that he is going to get a seizure but today he just started shaking.  So he brought him to the emergency room.  On arrival in the emergency room patient's postictal, but he is able to answer simple questions, he is tachycardic, his lung sounds are clear, O2 sat is 96% on room air.    Looking at his chart patient does not take any medicines for blood pressure or seizures, I searched his chart and found that he has been having seizures since at least 2019 or even before that.  His blood pressure is elevated but I do not see any blood pressure medicines listed for him.  The only medicines listed for him and has been filled is metformin.    Amount and/or Complexity of Data Reviewed  Labs: ordered. Decision-making details documented in ED Course.  Radiology: ordered.    Risk  OTC drugs.  Prescription drug management.               ED Course as of 03/13/25 1223   Thu Mar 13, 2025   1132 POC HCO3(!): 20.6  Patient's initial bicarb on arrival was 12, but now it has a up to 20, so I think that anion gap was more from seizure then DKA, he has not given us a urine sample, but otherwise he is awake alert oriented x3.  His wife came and says that she does take Keppra on a regular basis, and medicine for blood pressure [GQ]   1210 BP: 134/62 [GQ]   1210 Pulse: 87 [GQ]   1210 Resp: 20 [GQ]   1210 SpO2: 97 %  Patient is doing good, his blood pressure has come down, his the anion gap is better, his bicarb has come up to 20 on the blood gas, he does not have acidosis anymore, I think the low bicarb was because of the seizure and not because of the DKA.  He has a still not give him in the urine sample to see his ketones, I am going to let him go home with instruction to see his doctor to adjust his medicines for his diabetes, I will advise him  that he must take his Keppra on a regular basis, although he says that he is taking it, but he does not have any pharmacy records of filling it.  I will prescribe him Keppra and let him go home. [GQ]   1215 Patient has gotten insulin in the emergency room, he has gotten fluid bolus, his blood sugar has come down to 349, [GQ]   1221 Patient says he takes his Keppra only when needed, and I have explained to him that he has to take the Keppra on a regular basis to prevent the seizures there is no way for him to know when he needs Keppra.  But as his father was saying that he usually can tell that he is going to get a seizure.  Also advise him that he must go and talk to his family doctor to adjust his medicines for diabetes.  They see a nurse practitioner in [place] and the wife verbalized understanding and I am going to let them go home. [GQ]      ED Course User Index  [GQ] Antionette Neil MD                     Clinical Impression:  Final diagnoses:  [R56.9] Seizure (Primary)  [I10] Hypertension, unspecified type  [E11.65] Uncontrolled type 2 diabetes mellitus with hyperglycemia          ED Disposition Condition    Discharge Stable          ED Prescriptions    None       Follow-up Information       Follow up With Specialties Details Why Contact Info    PMD  In 2 days                 [1]   Social History  Tobacco Use    Smoking status: Never    Smokeless tobacco: Never   Substance Use Topics    Alcohol use: Not Currently    Drug use: Not Currently        Antionette Neil MD  03/13/25 8026

## 2025-04-11 ENCOUNTER — HOSPITAL ENCOUNTER (EMERGENCY)
Facility: HOSPITAL | Age: 25
Discharge: HOME | End: 2025-04-11
Attending: EMERGENCY MEDICINE
Payer: MEDICAID

## 2025-04-11 VITALS
RESPIRATION RATE: 20 BRPM | HEIGHT: 75 IN | TEMPERATURE: 98 F | DIASTOLIC BLOOD PRESSURE: 93 MMHG | OXYGEN SATURATION: 97 % | HEART RATE: 93 BPM | SYSTOLIC BLOOD PRESSURE: 145 MMHG | BODY MASS INDEX: 31.08 KG/M2 | WEIGHT: 250 LBS

## 2025-04-11 DIAGNOSIS — R56.9 SEIZURE: ICD-10-CM

## 2025-04-11 LAB
ACCEPTIBLE SP GR UR QL: >=1.03 (ref 1–1.03)
ALBUMIN SERPL-MCNC: 4.3 G/DL (ref 3.5–5)
ALBUMIN/GLOB SERPL: 1.2 RATIO (ref 1.1–2)
ALP SERPL-CCNC: 94 UNIT/L (ref 40–150)
ALT SERPL-CCNC: 65 UNIT/L (ref 0–55)
AMPHET UR QL SCN: POSITIVE
ANION GAP SERPL CALC-SCNC: 23 MEQ/L
AST SERPL-CCNC: 28 UNIT/L (ref 11–45)
BACTERIA #/AREA URNS AUTO: ABNORMAL /HPF
BARBITURATE SCN PRESENT UR: NEGATIVE
BASOPHILS # BLD AUTO: 0.08 X10(3)/MCL
BASOPHILS NFR BLD AUTO: 0.5 %
BENZODIAZ UR QL SCN: NEGATIVE
BILIRUB SERPL-MCNC: 0.8 MG/DL
BILIRUB UR QL STRIP.AUTO: NEGATIVE
BUN SERPL-MCNC: 20 MG/DL (ref 8.9–20.6)
CALCIUM SERPL-MCNC: 9.2 MG/DL (ref 8.4–10.2)
CANNABINOIDS UR QL SCN: NEGATIVE
CHLORIDE SERPL-SCNC: 105 MMOL/L (ref 98–107)
CLARITY UR: CLEAR
CO2 SERPL-SCNC: 10 MMOL/L (ref 22–29)
COCAINE UR QL SCN: NEGATIVE
COLOR UR AUTO: YELLOW
CREAT SERPL-MCNC: 1.18 MG/DL (ref 0.72–1.25)
CREAT/UREA NIT SERPL: 17
EOSINOPHIL # BLD AUTO: 0.44 X10(3)/MCL (ref 0–0.9)
EOSINOPHIL NFR BLD AUTO: 3 %
ERYTHROCYTE [DISTWIDTH] IN BLOOD BY AUTOMATED COUNT: 12.6 % (ref 11.5–17)
FENTANYL UR QL SCN: NEGATIVE
GFR SERPLBLD CREATININE-BSD FMLA CKD-EPI: >60 ML/MIN/1.73/M2
GLOBULIN SER-MCNC: 3.7 GM/DL (ref 2.4–3.5)
GLUCOSE SERPL-MCNC: 215 MG/DL (ref 74–100)
GLUCOSE UR QL STRIP: ABNORMAL
HCT VFR BLD AUTO: 48.9 % (ref 42–52)
HGB BLD-MCNC: 16 G/DL (ref 14–18)
HGB UR QL STRIP: ABNORMAL
IMM GRANULOCYTES # BLD AUTO: 0.04 X10(3)/MCL (ref 0–0.04)
IMM GRANULOCYTES NFR BLD AUTO: 0.3 %
KETONES UR QL STRIP: ABNORMAL
LEUKOCYTE ESTERASE UR QL STRIP: NEGATIVE
LYMPHOCYTES # BLD AUTO: 7.37 X10(3)/MCL (ref 0.6–4.6)
LYMPHOCYTES NFR BLD AUTO: 49.4 %
MAGNESIUM SERPL-MCNC: 2.1 MG/DL (ref 1.6–2.6)
MCH RBC QN AUTO: 28.7 PG (ref 27–31)
MCHC RBC AUTO-ENTMCNC: 32.7 G/DL (ref 33–36)
MCV RBC AUTO: 87.8 FL (ref 80–94)
MDMA UR QL SCN: NEGATIVE
MONOCYTES # BLD AUTO: 0.77 X10(3)/MCL (ref 0.1–1.3)
MONOCYTES NFR BLD AUTO: 5.2 %
NEUTROPHILS # BLD AUTO: 6.21 X10(3)/MCL (ref 2.1–9.2)
NEUTROPHILS NFR BLD AUTO: 41.6 %
NITRITE UR QL STRIP: NEGATIVE
NRBC BLD AUTO-RTO: 0 %
OPIATES UR QL SCN: NEGATIVE
PCP UR QL: NEGATIVE
PH UR STRIP: 5.5 [PH]
PH UR: 5.5 [PH] (ref 3–11)
PLATELET # BLD AUTO: 303 X10(3)/MCL (ref 130–400)
PMV BLD AUTO: 11.1 FL (ref 7.4–10.4)
POTASSIUM SERPL-SCNC: 4.3 MMOL/L (ref 3.5–5.1)
PROT SERPL-MCNC: 8 GM/DL (ref 6.4–8.3)
PROT UR QL STRIP: ABNORMAL
RBC # BLD AUTO: 5.57 X10(6)/MCL (ref 4.7–6.1)
RBC #/AREA URNS AUTO: ABNORMAL /HPF
SODIUM SERPL-SCNC: 138 MMOL/L (ref 136–145)
SP GR UR STRIP.AUTO: >=1.03 (ref 1–1.03)
SPERM URNS QL MICRO: ABNORMAL /HPF
SQUAMOUS #/AREA URNS AUTO: ABNORMAL /HPF
TROPONIN I SERPL-MCNC: <0.01 NG/ML (ref 0–0.04)
UROBILINOGEN UR STRIP-ACNC: 0.2
WBC # BLD AUTO: 14.91 X10(3)/MCL (ref 4.5–11.5)
WBC #/AREA URNS AUTO: ABNORMAL /HPF

## 2025-04-11 PROCEDURE — 81003 URINALYSIS AUTO W/O SCOPE: CPT | Performed by: EMERGENCY MEDICINE

## 2025-04-11 PROCEDURE — 80307 DRUG TEST PRSMV CHEM ANLYZR: CPT | Performed by: EMERGENCY MEDICINE

## 2025-04-11 PROCEDURE — 96374 THER/PROPH/DIAG INJ IV PUSH: CPT

## 2025-04-11 PROCEDURE — 84484 ASSAY OF TROPONIN QUANT: CPT | Performed by: EMERGENCY MEDICINE

## 2025-04-11 PROCEDURE — 83735 ASSAY OF MAGNESIUM: CPT | Performed by: EMERGENCY MEDICINE

## 2025-04-11 PROCEDURE — 80053 COMPREHEN METABOLIC PANEL: CPT | Performed by: EMERGENCY MEDICINE

## 2025-04-11 PROCEDURE — 93010 ELECTROCARDIOGRAM REPORT: CPT | Mod: ,,, | Performed by: INTERNAL MEDICINE

## 2025-04-11 PROCEDURE — 85025 COMPLETE CBC W/AUTO DIFF WBC: CPT | Performed by: EMERGENCY MEDICINE

## 2025-04-11 PROCEDURE — 99284 EMERGENCY DEPT VISIT MOD MDM: CPT | Mod: 25

## 2025-04-11 PROCEDURE — 93005 ELECTROCARDIOGRAM TRACING: CPT

## 2025-04-11 PROCEDURE — 63600175 PHARM REV CODE 636 W HCPCS: Performed by: EMERGENCY MEDICINE

## 2025-04-11 RX ORDER — LEVETIRACETAM 500 MG/5ML
1500 INJECTION, SOLUTION, CONCENTRATE INTRAVENOUS
Status: COMPLETED | OUTPATIENT
Start: 2025-04-11 | End: 2025-04-11

## 2025-04-11 RX ADMIN — LEVETIRACETAM 1500 MG: 100 INJECTION, SOLUTION INTRAVENOUS at 08:04

## 2025-04-11 NOTE — DISCHARGE INSTRUCTIONS
It is your responsibility to obtain follow up care as instructed. It is your responsibility to call the clinic, specialist, or primary care physician you are referred to for follow-up care and make an appointment. When you call to make an appointment, advise the  that you were referred by the emergency department.    If you become concerned about your condition and are unable to contact your physician, or if your condition is a medical emergency, return to the emergency department.    If X-rays were taken today, the interpretation in the emergency department is preliminary. If the final radiologist interpretation reveals a different significant finding, you will be contacted. There may be incidental findings that were detected on your imaging studies that do not impact your care in the emergency department. These incidental findings will need to be followed up by your primary care physician.

## 2025-04-11 NOTE — ED PROVIDER NOTES
Encounter Date: 4/11/2025       History     Chief Complaint   Patient presents with    Altered Mental Status    Seizures     Brought POV by co-workers for having a seizure in truck on the way to work, hx of seizures     The patient presents accompanied by his co-worker after having had a seizure.  The patient was leaving [facility] when he suddenly stiffened up and became unresponsive.  Currently he feels back to baseline.  He does not remember the event.  The patient has a history of seizures and takes Keppra 500 mg b.i.d..  He states he has been compliant with his medication.  His last seizure was about 1 month ago when he was seen in his ER.  He also has a history of hypertension and diabetes for which he takes metformin and lisinopril.  No recent illnesses.  He did not strike his head.        Review of patient's allergies indicates:  No Known Allergies  Past Medical History:   Diagnosis Date    Mild intermittent asthma, uncomplicated     Seizure      History reviewed. No pertinent surgical history.  No family history on file.  Social History[1]  Review of Systems   All other systems reviewed and are negative.      Physical Exam     Initial Vitals [04/11/25 0744]   BP Pulse Resp Temp SpO2   (!) 180/112 (!) 118 (!) 22 97.8 °F (36.6 °C) 97 %      MAP       --         Physical Exam    Constitutional:   Vital signs reviewed.  Nursing note reviewed.    General:  The patient is awake and alert, appropriate and interactive.    Eyes: Conjunctiva are clear.  Corneas appear normal.  EOMI.  ENT: Face, head, external nose, and ears are normal-appearing.  The oropharynx appears normal.  The uvula is midline.  The posterior pharyngeal elements are symmetric.  Voice is normal.  Mucous membranes moist.  Neck: The neck is nontender and supple with normal range of motion.  Back: The back appears normal with full range of motion.  Respiratory: The respiratory effort is normal.  The lungs are clear to auscultation.  Cardiovascular:  Heart sounds are normal.  No murmur or rub.  The rate is tachycardic at approximately 112 beats per minute.  The rhythm is normal.  Peripheral pulses are normal and equal bilaterally.  No edema is present.  Capillary refill is less than 3 seconds.  GI: The abdomen is soft, nondistended, without mass.  There is no tenderness to palpation.  No rebound or guarding.  Bowel sounds are normal.  The liver and spleen are nonpalpable.  Musculoskeletal: Range of motion of all joints appears normal without pain or tenderness.  Skin: There is no rash.  Neurologic: No focal deficits are noted.  The mental status is normal.  Cranial nerves are intact.  Motor strength is normal and symmetric bilaterally.  Sensation to light touch is intact and symmetrical.  Deep tendon reflexes are symmetric bilaterally.           ED Course   Procedures  Labs Reviewed   COMPREHENSIVE METABOLIC PANEL - Abnormal       Result Value    Sodium 138      Potassium 4.3      Chloride 105      CO2 10 (*)     Glucose 215 (*)     Blood Urea Nitrogen 20.0      Creatinine 1.18      Calcium 9.2      Protein Total 8.0      Albumin 4.3      Globulin 3.7 (*)     Albumin/Globulin Ratio 1.2      Bilirubin Total 0.8      ALP 94      ALT 65 (*)     AST 28      eGFR >60      Anion Gap 23.0      BUN/Creatinine Ratio 17     URINALYSIS, REFLEX TO URINE CULTURE - Abnormal    Color, UA Yellow      Appearance, UA Clear      Specific Gravity, UA >=1.030      pH, UA 5.5      Protein, UA 3+ (*)     Glucose, UA Trace (*)     Ketones, UA 1+ (*)     Blood, UA Trace-Intact (*)     Bilirubin, UA Negative      Urobilinogen, UA 0.2      Nitrites, UA Negative      Leukocyte Esterase, UA Negative     DRUG SCREEN, URINE (BEAKER) - Abnormal    Amphetamines, Urine Positive (*)     Barbiturates, Urine Negative      Benzodiazepine, Urine Negative      Cannabinoids, Urine Negative      Cocaine, Urine Negative      Fentanyl, Urine Negative      MDMA, Urine Negative      Opiates, Urine Negative       Phencyclidine, Urine Negative      pH, Urine 5.5      Specific Gravity, Urine Auto >=1.030      Narrative:     Cut off concentrations:    Amphetamines - 1000 ng/ml  Barbiturates - 200 ng/ml  Benzodiazepine - 200 ng/ml  Cannabinoids (THC) - 50 ng/ml  Cocaine - 300 ng/ml  Fentanyl - 1.0 ng/ml  MDMA - 500 ng/ml  Opiates - 300 ng/ml   Phencyclidine (PCP) - 25 ng/ml    Specimen submitted for drug analysis and tested for pH and specific gravity in order to evaluate sample integrity. Suspect tampering if specific gravity is <1.003 and/or pH is not within the range of 4.5 - 8.0  False negatives may result form substances such as bleach added to urine.  False positives may result for the presence of a substance with similar chemical structure to the drug or its metabolite.    This test provides only a PRELIMINARY analytical test result. A more specific alternate chemical method must be used in order to obtain a confirmed analytical result. Gas chromatography/mass spectrometry (GC/MS) is the preferred confirmatory method. Other chemical confirmation methods are available. Clinical consideration and professional judgement should be applied to any drug of abuse test result, particularly when preliminary positive results are used.    Positive results will be confirmed only at the physicians request. Unconfirmed screening results are to be used only for medical purposes (treatment).        CBC WITH DIFFERENTIAL - Abnormal    WBC 14.91 (*)     RBC 5.57      Hgb 16.0      Hct 48.9      MCV 87.8      MCH 28.7      MCHC 32.7 (*)     RDW 12.6      Platelet 303      MPV 11.1 (*)     Neut % 41.6      Lymph % 49.4      Mono % 5.2      Eos % 3.0      Basophil % 0.5      Imm Grans % 0.3      Neut # 6.21      Lymph # 7.37 (*)     Mono # 0.77      Eos # 0.44      Baso # 0.08      Imm Gran # 0.04      NRBC% 0.0     URINALYSIS, MICROSCOPIC - Abnormal    Bacteria, UA None Seen      Sperm, UA Few (*)     RBC, UA 0-2      WBC, UA None Seen       Squamous Epithelial Cells, UA None Seen     MAGNESIUM - Normal    Magnesium Level 2.10     TROPONIN I - Normal    Troponin-I <0.010     CBC W/ AUTO DIFFERENTIAL    Narrative:     The following orders were created for panel order CBC auto differential.  Procedure                               Abnormality         Status                     ---------                               -----------         ------                     CBC with Differential[[id number]]       Abnormal            Final result                 Please view results for these tests on the individual orders.     EKG Readings: (Independently Interpreted)   Sinus tachycardia with a rate of 113 beats per minute.  Normal axis.  Normal intervals.  No ST segment or T-wave changes.       Imaging Results    None          Medications   levETIRAcetam injection 1,500 mg (1,500 mg Intravenous Given 4/11/25 0808)     Medical Decision Making  I rechecked the patient twice during his ED stay, most recently at 9:00 a.m..  He is resting comfortably in voices no complaints.  His mental status and neurologic status is at baseline.  His bicarbonate is low but I suspect this is due to his recent seizure.  His glucose is slightly elevated but not at a level that needs any acute intervention.  He is only on Keppra 500 mg b.i.d. and I do not feel that this is an adequate dose.  After discussion with the patient and his spouse, the plan is to double him to 1000 mg twice a day.  He has and appointment with his PCP on Tuesday (4 days) and I have encouraged him to keep his follow up appointment.    Based on the history, physical, and diagnostic studies, I think the patient is at low risk for life-threatening causes of the neurologic symptoms.  I have considered stroke, ICH, myelopathy, radiculopathy, peripheral nerve compression/disorder, metabolic causes, and nonphysiologic causes.  The patient and/or caregiver understand the need to return to ED immediately for any new,  changing, or worsening symptoms.  They are to return to the ED if they can not see a physician to have other concerns.  The patient will pursue further outpatient evaluation with the primary care physician and/or specialist as indicated in the discharge instructions.    Decision to admit/transfer/discharge:  After my evaluation of the patient and interpretation of all relevant information, the decision has been made to discharge the patient.    I independently reviewed and interpreted any laboratory tests, radiographic images, EKGs, rhythm strips, and other diagnostic tests that were obtained during this Emergency Department visit.    Drug/medication management:  Parenteral controlled substances and other dangerous medications, if administered, can be found in the order section of this chart.  I reviewed the patient's home medications and made changes/adjustments as medically indicated.  Any new medications are documented under the prescription section of this chart.    Social determinants of health addressed during this ED visit:  The patient/caregiver knowledge deficit about today's condition and treatment plan was addressed by me through appropriate patient/caregiver education.    Additional verbal discharge instructions were given and discussed with the patient.  I have spoken with the patient and/or caregivers. I have explained the patient's condition, diagnoses and treatment plan based on the information available to me at this time. I have answered the patient's and/or caregiver's questions and addressed any concerns. The patient and/or caregivers have as good an understanding of the patient's diagnosis, condition and treatment plan as can be expected at this point. The vital signs have been stable. The patient's condition is stable and appropriate for discharge from the emergency department.     The patient will pursue further outpatient evaluation with the primary care physician or other designated or  consulting physician as outlined in the discharge instructions. The patient and/or caregivers are agreeable to this plan of care and follow-up instructions have been explained in detail. The patient and/or caregivers have expressed an understanding of the discharge instructions. The patient and/or caregivers are aware that any significant change in condition or worsening of symptoms should prompt an immediate return to this or the closest emergency department or a call to 911.    Amount and/or Complexity of Data Reviewed  Labs: ordered.    Risk  Prescription drug management.                                      Clinical Impression:  Final diagnoses:  [R56.9] Seizure          ED Disposition Condition    Discharge Stable          ED Prescriptions    None       Follow-up Information       Follow up With Specialties Details Why Contact Info        Follow up with your family doctor on Tuesday as scheduled.      Increase your Keppra from 1 pill twice daily to 2 pills twice daily.                 [1]   Social History  Tobacco Use    Smoking status: Never    Smokeless tobacco: Never   Substance Use Topics    Alcohol use: Not Currently    Drug use: Not Currently        Anthony Victor MD  04/11/25 0925

## 2025-04-12 LAB
OHS QRS DURATION: 98 MS
OHS QTC CALCULATION: 469 MS

## 2025-04-21 ENCOUNTER — HOSPITAL ENCOUNTER (EMERGENCY)
Facility: HOSPITAL | Age: 25
Discharge: HOME | End: 2025-04-21
Attending: EMERGENCY MEDICINE
Payer: MEDICAID

## 2025-04-21 VITALS
TEMPERATURE: 98 F | OXYGEN SATURATION: 99 % | SYSTOLIC BLOOD PRESSURE: 129 MMHG | HEART RATE: 67 BPM | WEIGHT: 276 LBS | DIASTOLIC BLOOD PRESSURE: 79 MMHG | HEIGHT: 75 IN | RESPIRATION RATE: 14 BRPM | BODY MASS INDEX: 34.32 KG/M2

## 2025-04-21 DIAGNOSIS — R56.9 SEIZURE: ICD-10-CM

## 2025-04-21 DIAGNOSIS — F15.10 METHAMPHETAMINE ABUSE: Primary | ICD-10-CM

## 2025-04-21 LAB
ACCEPTIBLE SP GR UR QL: 1.01 (ref 1–1.03)
ALBUMIN SERPL-MCNC: 3.8 G/DL (ref 3.5–5)
ALBUMIN/GLOB SERPL: 1.1 RATIO (ref 1.1–2)
ALP SERPL-CCNC: 98 UNIT/L (ref 40–150)
ALT SERPL-CCNC: 42 UNIT/L (ref 0–55)
AMPHET UR QL SCN: POSITIVE
ANION GAP SERPL CALC-SCNC: 13 MEQ/L
AST SERPL-CCNC: 20 UNIT/L (ref 11–45)
BACTERIA #/AREA URNS AUTO: ABNORMAL /HPF
BARBITURATE SCN PRESENT UR: NEGATIVE
BASOPHILS # BLD AUTO: 0.03 X10(3)/MCL
BASOPHILS NFR BLD AUTO: 0.4 %
BENZODIAZ UR QL SCN: NEGATIVE
BILIRUB SERPL-MCNC: 0.4 MG/DL
BILIRUB UR QL STRIP.AUTO: NEGATIVE
BUN SERPL-MCNC: 15 MG/DL (ref 8.9–20.6)
CALCIUM SERPL-MCNC: 8.8 MG/DL (ref 8.4–10.2)
CANNABINOIDS UR QL SCN: NEGATIVE
CHLORIDE SERPL-SCNC: 107 MMOL/L (ref 98–107)
CLARITY UR: CLEAR
CO2 SERPL-SCNC: 19 MMOL/L (ref 22–29)
COCAINE UR QL SCN: NEGATIVE
COLOR UR AUTO: YELLOW
CREAT SERPL-MCNC: 0.88 MG/DL (ref 0.72–1.25)
CREAT/UREA NIT SERPL: 17
EOSINOPHIL # BLD AUTO: 0.24 X10(3)/MCL (ref 0–0.9)
EOSINOPHIL NFR BLD AUTO: 2.9 %
ERYTHROCYTE [DISTWIDTH] IN BLOOD BY AUTOMATED COUNT: 12.5 % (ref 11.5–17)
FENTANYL UR QL SCN: NEGATIVE
GFR SERPLBLD CREATININE-BSD FMLA CKD-EPI: >60 ML/MIN/1.73/M2
GLOBULIN SER-MCNC: 3.4 GM/DL (ref 2.4–3.5)
GLUCOSE SERPL-MCNC: 179 MG/DL (ref 74–100)
GLUCOSE UR QL STRIP: NEGATIVE
HCT VFR BLD AUTO: 45.4 % (ref 42–52)
HGB BLD-MCNC: 15.4 G/DL (ref 14–18)
HGB UR QL STRIP: NEGATIVE
IMM GRANULOCYTES # BLD AUTO: 0.03 X10(3)/MCL (ref 0–0.04)
IMM GRANULOCYTES NFR BLD AUTO: 0.4 %
KETONES UR QL STRIP: NEGATIVE
LEUKOCYTE ESTERASE UR QL STRIP: NEGATIVE
LYMPHOCYTES # BLD AUTO: 1.72 X10(3)/MCL (ref 0.6–4.6)
LYMPHOCYTES NFR BLD AUTO: 20.5 %
MCH RBC QN AUTO: 28.7 PG (ref 27–31)
MCHC RBC AUTO-ENTMCNC: 33.9 G/DL (ref 33–36)
MCV RBC AUTO: 84.7 FL (ref 80–94)
MDMA UR QL SCN: NEGATIVE
MONOCYTES # BLD AUTO: 0.35 X10(3)/MCL (ref 0.1–1.3)
MONOCYTES NFR BLD AUTO: 4.2 %
NEUTROPHILS # BLD AUTO: 6.03 X10(3)/MCL (ref 2.1–9.2)
NEUTROPHILS NFR BLD AUTO: 71.6 %
NITRITE UR QL STRIP: NEGATIVE
NRBC BLD AUTO-RTO: 0 %
OHS QRS DURATION: 102 MS
OHS QTC CALCULATION: 438 MS
OPIATES UR QL SCN: NEGATIVE
PCP UR QL: NEGATIVE
PH UR STRIP: 6 [PH]
PH UR: 6 [PH] (ref 3–11)
PLATELET # BLD AUTO: 209 X10(3)/MCL (ref 130–400)
PMV BLD AUTO: 10.7 FL (ref 7.4–10.4)
POTASSIUM SERPL-SCNC: 3.9 MMOL/L (ref 3.5–5.1)
PROT SERPL-MCNC: 7.2 GM/DL (ref 6.4–8.3)
PROT UR QL STRIP: ABNORMAL
RBC # BLD AUTO: 5.36 X10(6)/MCL (ref 4.7–6.1)
RBC #/AREA URNS AUTO: ABNORMAL /HPF
SODIUM SERPL-SCNC: 139 MMOL/L (ref 136–145)
SP GR UR STRIP.AUTO: 1.01 (ref 1–1.03)
SPERM URNS QL MICRO: ABNORMAL /HPF
SQUAMOUS #/AREA URNS AUTO: ABNORMAL /HPF
UROBILINOGEN UR STRIP-ACNC: 0.2
WBC # BLD AUTO: 8.4 X10(3)/MCL (ref 4.5–11.5)
WBC #/AREA URNS AUTO: ABNORMAL /HPF

## 2025-04-21 PROCEDURE — 80053 COMPREHEN METABOLIC PANEL: CPT | Performed by: EMERGENCY MEDICINE

## 2025-04-21 PROCEDURE — 99284 EMERGENCY DEPT VISIT MOD MDM: CPT | Mod: 25

## 2025-04-21 PROCEDURE — 93010 ELECTROCARDIOGRAM REPORT: CPT | Mod: ,,, | Performed by: INTERNAL MEDICINE

## 2025-04-21 PROCEDURE — 80307 DRUG TEST PRSMV CHEM ANLYZR: CPT | Performed by: EMERGENCY MEDICINE

## 2025-04-21 PROCEDURE — 85025 COMPLETE CBC W/AUTO DIFF WBC: CPT | Performed by: EMERGENCY MEDICINE

## 2025-04-21 PROCEDURE — 93005 ELECTROCARDIOGRAM TRACING: CPT

## 2025-04-21 PROCEDURE — 81001 URINALYSIS AUTO W/SCOPE: CPT | Performed by: EMERGENCY MEDICINE

## 2025-04-21 PROCEDURE — 25000003 PHARM REV CODE 250: Performed by: EMERGENCY MEDICINE

## 2025-04-21 RX ORDER — SODIUM CHLORIDE 9 MG/ML
1000 INJECTION, SOLUTION INTRAVENOUS
Status: COMPLETED | OUTPATIENT
Start: 2025-04-21 | End: 2025-04-21

## 2025-04-21 RX ADMIN — SODIUM CHLORIDE 1000 ML: 9 INJECTION, SOLUTION INTRAVENOUS at 09:04

## 2025-04-21 NOTE — ED PROVIDER NOTES
Encounter Date: 4/21/2025       History     Chief Complaint   Patient presents with    Seizures     Brought per [facility] for having seizure this am, hx seizures     HPI  24-year-old male history of seizures on Keppra 500 b.i.d., diabetes, hypertension brought in by EMS for seizure just prior to arrival  Patient describes the seizure as tonic-clonic, no head trauma.  Patient denies incontinence or tongue biting.  Patient now alert and oriented with no headache, neck pain, chest pain, shortness of breath, abdominal pain, focal motor or sensory changes.  Patient states he missed his Keppra dose yesterday but did take his Keppra dose this a.m..  Review of patient's allergies indicates:  No Known Allergies  Past Medical History:   Diagnosis Date    Mild intermittent asthma, uncomplicated     Seizure      History reviewed. No pertinent surgical history.  No family history on file.  Social History[1]  Review of Systems   All other systems reviewed and are negative.      Physical Exam     Initial Vitals [04/21/25 0916]   BP Pulse Resp Temp SpO2   (!) 140/77 86 18 97.5 °F (36.4 °C) 96 %      MAP       --         Physical Exam    Nursing note and vitals reviewed.  Constitutional: He appears well-developed and well-nourished. He is cooperative.   HENT:   Head: Normocephalic and atraumatic.   Nose: Nose normal. Mouth/Throat: Oropharynx is clear and moist.   Eyes: Conjunctivae and lids are normal.   Neck: Trachea normal. Neck supple.   Normal range of motion.  Cardiovascular:  Normal rate, regular rhythm, normal heart sounds and intact distal pulses.           Pulmonary/Chest: Breath sounds normal. No respiratory distress. He has no wheezes. He has no rhonchi. He has no rales. He exhibits no tenderness.   Abdominal: Abdomen is soft. Bowel sounds are normal. There is no abdominal tenderness.   Musculoskeletal:         General: Normal range of motion.      Cervical back: Normal range of motion and neck supple.     Neurological: He is  alert and oriented to person, place, and time. He has normal strength. He displays normal reflexes. No cranial nerve deficit or sensory deficit. GCS score is 15. GCS eye subscore is 4. GCS verbal subscore is 5. GCS motor subscore is 6.   Skin: Skin is warm and dry. No rash noted.   Psychiatric: He has a normal mood and affect. His speech is normal and behavior is normal. Judgment and thought content normal.         ED Course   Procedures  Labs Reviewed   COMPREHENSIVE METABOLIC PANEL - Abnormal       Result Value    Sodium 139      Potassium 3.9      Chloride 107      CO2 19 (*)     Glucose 179 (*)     Blood Urea Nitrogen 15.0      Creatinine 0.88      Calcium 8.8      Protein Total 7.2      Albumin 3.8      Globulin 3.4      Albumin/Globulin Ratio 1.1      Bilirubin Total 0.4      ALP 98      ALT 42      AST 20      eGFR >60      Anion Gap 13.0      BUN/Creatinine Ratio 17     URINALYSIS - Abnormal    Color, UA Yellow      Appearance, UA Clear      Specific Gravity, UA 1.015      pH, UA 6.0      Protein, UA 2+ (*)     Glucose, UA Negative      Ketones, UA Negative      Blood, UA Negative      Bilirubin, UA Negative      Urobilinogen, UA 0.2      Nitrites, UA Negative      Leukocyte Esterase, UA Negative     DRUG SCREEN, URINE (BEAKER) - Abnormal    Amphetamines, Urine Positive (*)     Barbiturates, Urine Negative      Benzodiazepine, Urine Negative      Cannabinoids, Urine Negative      Cocaine, Urine Negative      Fentanyl, Urine Negative      MDMA, Urine Negative      Opiates, Urine Negative      Phencyclidine, Urine Negative      pH, Urine 6.0      Specific Gravity, Urine Auto 1.015      Narrative:     Cut off concentrations:    Amphetamines - 1000 ng/ml  Barbiturates - 200 ng/ml  Benzodiazepine - 200 ng/ml  Cannabinoids (THC) - 50 ng/ml  Cocaine - 300 ng/ml  Fentanyl - 1.0 ng/ml  MDMA - 500 ng/ml  Opiates - 300 ng/ml   Phencyclidine (PCP) - 25 ng/ml    Specimen submitted for drug analysis and tested for pH and  specific gravity in order to evaluate sample integrity. Suspect tampering if specific gravity is <1.003 and/or pH is not within the range of 4.5 - 8.0  False negatives may result form substances such as bleach added to urine.  False positives may result for the presence of a substance with similar chemical structure to the drug or its metabolite.    This test provides only a PRELIMINARY analytical test result. A more specific alternate chemical method must be used in order to obtain a confirmed analytical result. Gas chromatography/mass spectrometry (GC/MS) is the preferred confirmatory method. Other chemical confirmation methods are available. Clinical consideration and professional judgement should be applied to any drug of abuse test result, particularly when preliminary positive results are used.    Positive results will be confirmed only at the physicians request. Unconfirmed screening results are to be used only for medical purposes (treatment).        CBC WITH DIFFERENTIAL - Abnormal    WBC 8.40      RBC 5.36      Hgb 15.4      Hct 45.4      MCV 84.7      MCH 28.7      MCHC 33.9      RDW 12.5      Platelet 209      MPV 10.7 (*)     Neut % 71.6      Lymph % 20.5      Mono % 4.2      Eos % 2.9      Basophil % 0.4      Imm Grans % 0.4      Neut # 6.03      Lymph # 1.72      Mono # 0.35      Eos # 0.24      Baso # 0.03      Imm Gran # 0.03      NRBC% 0.0     URINALYSIS, MICROSCOPIC - Abnormal    Bacteria, UA None Seen      Sperm, UA Occasional (*)     RBC, UA 0-2      WBC, UA None Seen      Squamous Epithelial Cells, UA Occasional     CBC W/ AUTO DIFFERENTIAL    Narrative:     The following orders were created for panel order CBC auto differential.  Procedure                               Abnormality         Status                     ---------                               -----------         ------                     CBC with Differential[[id number]]       Abnormal            Final result                  Please view results for these tests on the individual orders.     EKG Readings: (Independently Interpreted)   Normal sinus rhythm 88, right axis, no acute ST-elevation or ST-depression,     ECG Results              EKG 12-lead (Final result)        Collection Time Result Time QRS Duration [facility] QTC Calculation    04/21/25 09:22:56 04/21/25 11:37:20 102 438                     Final result by Interface, Lab In [facility] (04/21/25 11:37:23)                   Narrative:    Test Reason : R56.9,    Vent. Rate :  88 BPM     Atrial Rate :  88 BPM     P-R Int : 164 ms          QRS Dur : 102 ms      QT Int : 362 ms       P-R-T Axes :  39  98  18 degrees    QTcB Int : 438 ms    Normal sinus rhythm  Rightward axis  Borderline Abnormal ECG  When compared with ECG of 11-Apr-2025 07:56,  No significant change was found  Confirmed by Navneet Oneil ([id number]) on 4/21/2025 11:37:14 AM    Referred By: AA[facility]FERRAL SELF           Confirmed By: Navneet Oneil                                  Imaging Results    None          Medications   0.9% NaCl infusion (0 mLs Intravenous Stopped 4/21/25 1000)     Medical Decision Making  Amount and/or Complexity of Data Reviewed  Labs: ordered.    Risk  Prescription drug management.    24-year-old male history of seizure disorder brought in by EMS for seizure earlier just prior to arrival.  Patient states he did miss his Keppra dose yesterday but did take his Keppra this a.m. prior to his seizure.  Patient now alert and oriented with no complaints.  Nonfocal neuro exam.  Vital signs remained stable in ED, afebrile, O2 sat 96% on room air.  Labs essentially WNL except a bicarb of 19 and glucose of 179.  EKG with no acute ischemic changes.  UA with no evidence of infection.  Urine tox positive for methamphetamine.  We will discharge patient home to continue his Keppra as previously prescribed, plenty of p.o. fluids, Tylenol for pain, close follow up with PCP in 2 days for recheck, return for worsening  symptoms or any other concerns.                                  Clinical Impression:  Final diagnoses:  [R56.9] Seizure  [F15.10] Methamphetamine abuse (Primary)          ED Disposition Condition    Discharge Good          ED Prescriptions    None       Follow-up Information       Follow up With Specialties Details Why Contact Info      Schedule an appointment as soon as possible for a visit in 2 days  Follow up with your primary care provider in 2 days for Gunner Jimenez MD  04/22/25 0657         [1]   Social History  Tobacco Use    Smoking status: Never    Smokeless tobacco: Never   Substance Use Topics    Alcohol use: Not Currently    Drug use: Not Currently        Gunner Myles MD  04/22/25 0658

## 2025-04-26 ENCOUNTER — HOSPITAL ENCOUNTER (EMERGENCY)
Facility: HOSPITAL | Age: 25
Discharge: HOME | End: 2025-04-26
Attending: EMERGENCY MEDICINE
Payer: MEDICAID

## 2025-04-26 VITALS
RESPIRATION RATE: 20 BRPM | HEIGHT: 74 IN | SYSTOLIC BLOOD PRESSURE: 130 MMHG | OXYGEN SATURATION: 96 % | BODY MASS INDEX: 38.24 KG/M2 | WEIGHT: 298 LBS | HEART RATE: 72 BPM | DIASTOLIC BLOOD PRESSURE: 68 MMHG | TEMPERATURE: 97 F

## 2025-04-26 DIAGNOSIS — R56.9 SEIZURE: Primary | ICD-10-CM

## 2025-04-26 DIAGNOSIS — F19.11 HISTORY OF SUBSTANCE ABUSE: ICD-10-CM

## 2025-04-26 LAB
ACCEPTIBLE SP GR UR QL: 1.01 (ref 1–1.03)
ALBUMIN SERPL-MCNC: 3.8 G/DL (ref 3.5–5)
ALBUMIN/GLOB SERPL: 1.1 RATIO (ref 1.1–2)
ALP SERPL-CCNC: 77 UNIT/L (ref 40–150)
ALT SERPL-CCNC: 46 UNIT/L (ref 0–55)
AMPHET UR QL SCN: NEGATIVE
ANION GAP SERPL CALC-SCNC: 13 MEQ/L
AST SERPL-CCNC: 22 UNIT/L (ref 11–45)
BACTERIA #/AREA URNS AUTO: ABNORMAL /HPF
BARBITURATE SCN PRESENT UR: NEGATIVE
BASOPHILS # BLD AUTO: 0.03 X10(3)/MCL
BASOPHILS NFR BLD AUTO: 0.4 %
BENZODIAZ UR QL SCN: NEGATIVE
BILIRUB SERPL-MCNC: 0.6 MG/DL
BILIRUB UR QL STRIP.AUTO: NEGATIVE
BUN SERPL-MCNC: 14.8 MG/DL (ref 8.9–20.6)
CALCIUM SERPL-MCNC: 9.2 MG/DL (ref 8.4–10.2)
CANNABINOIDS UR QL SCN: NEGATIVE
CHLORIDE SERPL-SCNC: 106 MMOL/L (ref 98–107)
CK SERPL-CCNC: 247 U/L (ref 30–200)
CLARITY UR: CLEAR
CO2 SERPL-SCNC: 22 MMOL/L (ref 22–29)
COCAINE UR QL SCN: NEGATIVE
COLOR UR AUTO: COLORLESS
CREAT SERPL-MCNC: 0.94 MG/DL (ref 0.72–1.25)
CREAT/UREA NIT SERPL: 16
EOSINOPHIL # BLD AUTO: 0.23 X10(3)/MCL (ref 0–0.9)
EOSINOPHIL NFR BLD AUTO: 2.7 %
ERYTHROCYTE [DISTWIDTH] IN BLOOD BY AUTOMATED COUNT: 12.9 % (ref 11.5–17)
FENTANYL UR QL SCN: NEGATIVE
GFR SERPLBLD CREATININE-BSD FMLA CKD-EPI: >60 ML/MIN/1.73/M2
GLOBULIN SER-MCNC: 3.6 GM/DL (ref 2.4–3.5)
GLUCOSE SERPL-MCNC: 181 MG/DL (ref 74–100)
GLUCOSE UR QL STRIP: NEGATIVE
HCT VFR BLD AUTO: 45 % (ref 42–52)
HGB BLD-MCNC: 15.3 G/DL (ref 14–18)
HGB UR QL STRIP: NEGATIVE
HOLD SPECIMEN: NORMAL
IMM GRANULOCYTES # BLD AUTO: 0.02 X10(3)/MCL (ref 0–0.04)
IMM GRANULOCYTES NFR BLD AUTO: 0.2 %
KETONES UR QL STRIP: NEGATIVE
LEUKOCYTE ESTERASE UR QL STRIP: NEGATIVE
LYMPHOCYTES # BLD AUTO: 2.7 X10(3)/MCL (ref 0.6–4.6)
LYMPHOCYTES NFR BLD AUTO: 32.1 %
MAGNESIUM SERPL-MCNC: 1.8 MG/DL (ref 1.6–2.6)
MCH RBC QN AUTO: 28.9 PG (ref 27–31)
MCHC RBC AUTO-ENTMCNC: 34 G/DL (ref 33–36)
MCV RBC AUTO: 85.1 FL (ref 80–94)
MDMA UR QL SCN: NEGATIVE
MONOCYTES # BLD AUTO: 0.49 X10(3)/MCL (ref 0.1–1.3)
MONOCYTES NFR BLD AUTO: 5.8 %
NEUTROPHILS # BLD AUTO: 4.95 X10(3)/MCL (ref 2.1–9.2)
NEUTROPHILS NFR BLD AUTO: 58.8 %
NITRITE UR QL STRIP: NEGATIVE
NRBC BLD AUTO-RTO: 0 %
OPIATES UR QL SCN: NEGATIVE
PCP UR QL: NEGATIVE
PH UR STRIP: 5.5 [PH]
PH UR: 5.5 [PH] (ref 3–11)
PLATELET # BLD AUTO: 219 X10(3)/MCL (ref 130–400)
PMV BLD AUTO: 11 FL (ref 7.4–10.4)
POTASSIUM SERPL-SCNC: 4 MMOL/L (ref 3.5–5.1)
PROT SERPL-MCNC: 7.4 GM/DL (ref 6.4–8.3)
PROT UR QL STRIP: NEGATIVE
RBC # BLD AUTO: 5.29 X10(6)/MCL (ref 4.7–6.1)
RBC #/AREA URNS AUTO: ABNORMAL /HPF
SODIUM SERPL-SCNC: 141 MMOL/L (ref 136–145)
SP GR UR STRIP.AUTO: 1.01 (ref 1–1.03)
SQUAMOUS #/AREA URNS LPF: ABNORMAL /HPF
TROPONIN I SERPL-MCNC: <0.01 NG/ML (ref 0–0.04)
UROBILINOGEN UR STRIP-ACNC: NORMAL
VALPROATE SERPL-MCNC: 43.7 UG/ML (ref 50–100)
WBC # BLD AUTO: 8.42 X10(3)/MCL (ref 4.5–11.5)
WBC #/AREA URNS AUTO: ABNORMAL /HPF

## 2025-04-26 PROCEDURE — 99284 EMERGENCY DEPT VISIT MOD MDM: CPT | Mod: 25

## 2025-04-26 PROCEDURE — 80307 DRUG TEST PRSMV CHEM ANLYZR: CPT | Performed by: NURSE PRACTITIONER

## 2025-04-26 PROCEDURE — 83735 ASSAY OF MAGNESIUM: CPT | Performed by: NURSE PRACTITIONER

## 2025-04-26 PROCEDURE — 81001 URINALYSIS AUTO W/SCOPE: CPT | Performed by: NURSE PRACTITIONER

## 2025-04-26 PROCEDURE — 80164 ASSAY DIPROPYLACETIC ACD TOT: CPT | Performed by: NURSE PRACTITIONER

## 2025-04-26 PROCEDURE — 25000003 PHARM REV CODE 250: Performed by: NURSE PRACTITIONER

## 2025-04-26 PROCEDURE — 80053 COMPREHEN METABOLIC PANEL: CPT | Performed by: NURSE PRACTITIONER

## 2025-04-26 PROCEDURE — 84484 ASSAY OF TROPONIN QUANT: CPT | Performed by: NURSE PRACTITIONER

## 2025-04-26 PROCEDURE — 93005 ELECTROCARDIOGRAM TRACING: CPT

## 2025-04-26 PROCEDURE — 85025 COMPLETE CBC W/AUTO DIFF WBC: CPT | Performed by: NURSE PRACTITIONER

## 2025-04-26 PROCEDURE — 82550 ASSAY OF CK (CPK): CPT | Performed by: NURSE PRACTITIONER

## 2025-04-26 PROCEDURE — 96360 HYDRATION IV INFUSION INIT: CPT

## 2025-04-26 RX ORDER — DIVALPROEX SODIUM 500 MG/1
500 TABLET, DELAYED RELEASE ORAL EVERY 12 HOURS
COMMUNITY

## 2025-04-26 RX ADMIN — SODIUM CHLORIDE 1000 ML: 9 INJECTION, SOLUTION INTRAVENOUS at 04:04

## 2025-04-26 NOTE — DISCHARGE INSTRUCTIONS
Follow up with [facility] Clinic as discussed to obtain a PCP.  Follow up with neurology clinic as discussed.  Continue to take your home medications as prescribed.  Increase oral fluid hydration.

## 2025-04-26 NOTE — Clinical Note
"Alvarze"Damari Torres was seen and treated in our emergency department on 4/26/2025.  He may return to work on 04/29/2025.       If you have any questions or concerns, please don't hesitate to call.      Anthony Domínguez Jr., FNP"

## 2025-04-26 NOTE — ED PROVIDER NOTES
Encounter Date: 4/26/2025       History     Chief Complaint   Patient presents with    Seizures     Reports grand-mal seizure this morning. States has been having seizures more frequently lately and had 4 in the past 2 weeks. Currently awake alert oriented. Compliant with medications.     Patient is a 24-year-old male presents for evaluation after experiencing a witnessed seizure at his home this a.m..  Patient admits to a history of seizure disorder and was previously on Keppra for symptom relief.  The patient reports having increased episodes of seizures over the last 1-2 months.  Denies having a current PCP to regulate his medication.  Denies chest pain, shortness of breath, fever, abdominal pain, or loss of bowel or bladder control.  The patient's sister is at bedside.      Review of patient's allergies indicates:  No Known Allergies  Past Medical History:   Diagnosis Date    Mild intermittent asthma, uncomplicated     Seizure      History reviewed. No pertinent surgical history.  No family history on file.  Social History[1]  Review of Systems   Constitutional:  Negative for chills, diaphoresis, fatigue and fever.   HENT:  Negative for facial swelling, postnasal drip, rhinorrhea, sinus pressure, sinus pain, sore throat and trouble swallowing.    Respiratory:  Negative for cough, chest tightness, shortness of breath and wheezing.    Cardiovascular:  Negative for chest pain, palpitations and leg swelling.   Gastrointestinal:  Negative for abdominal pain, diarrhea, nausea and vomiting.   Genitourinary:  Negative for dysuria, flank pain, hematuria and urgency.   Musculoskeletal:  Negative for arthralgias, back pain and myalgias.   Skin:  Negative for color change and rash.   Neurological:  Positive for seizures. Negative for dizziness, syncope, weakness and headaches.   Hematological:  Does not bruise/bleed easily.   All other systems reviewed and are negative.      Physical Exam     Initial Vitals [04/26/25 1555]    BP Pulse Resp Temp SpO2   (!) 142/61 82 20 96.8 °F (36 °C) 95 %      MAP       --         Physical Exam    Nursing note and vitals reviewed.  Constitutional: Vital signs are normal. He appears well-developed and well-nourished.   HENT:   Head: Normocephalic.   Nose: Nose normal. Mouth/Throat: Oropharynx is clear and moist.   Eyes: Conjunctivae and EOM are normal. Pupils are equal, round, and reactive to light.   Neck: Neck supple.   Normal range of motion.  Cardiovascular:  Normal rate, regular rhythm, normal heart sounds and intact distal pulses.           Pulmonary/Chest: Effort normal and breath sounds normal. No respiratory distress. He has no wheezes. He has no rhonchi. He has no rales. He exhibits no tenderness.   Abdominal: Abdomen is soft and flat. Bowel sounds are normal. There is no abdominal tenderness. There is no rebound, no guarding, no tenderness at McBurney's point and negative Moss's sign.   Musculoskeletal:         General: Normal range of motion.      Cervical back: Normal range of motion and neck supple.     Neurological: He is alert and oriented to person, place, and time. He has normal strength.   Skin: Skin is warm and dry. Capillary refill takes less than 2 seconds.   Psychiatric: He has a normal mood and affect. His behavior is normal. Judgment and thought content normal.         ED Course   Procedures  Labs Reviewed   COMPREHENSIVE METABOLIC PANEL - Abnormal       Result Value    Sodium 141      Potassium 4.0      Chloride 106      CO2 22      Glucose 181 (*)     Blood Urea Nitrogen 14.8      Creatinine 0.94      Calcium 9.2      Protein Total 7.4      Albumin 3.8      Globulin 3.6 (*)     Albumin/Globulin Ratio 1.1      Bilirubin Total 0.6      ALP 77      ALT 46      AST 22      eGFR >60      Anion Gap 13.0      BUN/Creatinine Ratio 16     URINALYSIS, REFLEX TO URINE CULTURE - Abnormal    Color, UA Colorless (*)     Appearance, UA Clear      Specific Gravity, UA 1.013      pH, UA 5.5       Protein, UA Negative      Glucose, UA Negative      Ketones, UA Negative      Blood, UA Negative      Bilirubin, UA Negative      Urobilinogen, UA Normal      Nitrites, UA Negative      Leukocyte Esterase, UA Negative      RBC, UA None Seen      WBC, UA 0-5      Bacteria, UA None Seen      Squamous Epithelial Cells, UA Trace     CK - Abnormal    Creatine Kinase 247 (*)    CBC WITH DIFFERENTIAL - Abnormal    WBC 8.42      RBC 5.29      Hgb 15.3      Hct 45.0      MCV 85.1      MCH 28.9      MCHC 34.0      RDW 12.9      Platelet 219      MPV 11.0 (*)     Neut % 58.8      Lymph % 32.1      Mono % 5.8      Eos % 2.7      Basophil % 0.4      Imm Grans % 0.2      Neut # 4.95      Lymph # 2.70      Mono # 0.49      Eos # 0.23      Baso # 0.03      Imm Gran # 0.02      NRBC% 0.0     VALPROIC ACID - Abnormal    Valproic Acid 43.7 (*)    TROPONIN I - Normal    Troponin-I <0.010     MAGNESIUM - Normal    Magnesium Level 1.80     DRUG SCREEN, URINE (BEAKER) - Normal    Amphetamines, Urine Negative      Barbiturates, Urine Negative      Benzodiazepine, Urine Negative      Cannabinoids, Urine Negative      Cocaine, Urine Negative      Fentanyl, Urine Negative      MDMA, Urine Negative      Opiates, Urine Negative      Phencyclidine, Urine Negative      pH, Urine 5.5      Specific Gravity, Urine Auto 1.013      Narrative:     Cut off concentrations:    Amphetamines - 1000 ng/ml  Barbiturates - 200 ng/ml  Benzodiazepine - 200 ng/ml  Cannabinoids (THC) - 50 ng/ml  Cocaine - 300 ng/ml  Fentanyl - 1.0 ng/ml  MDMA - 500 ng/ml  Opiates - 300 ng/ml   Phencyclidine (PCP) - 25 ng/ml    Specimen submitted for drug analysis and tested for pH and specific gravity in order to evaluate sample integrity. Suspect tampering if specific gravity is <1.003 and/or pH is not within the range of 4.5 - 8.0  False negatives may result form substances such as bleach added to urine.  False positives may result for the presence of a substance with  similar chemical structure to the drug or its metabolite.    This test provides only a PRELIMINARY analytical test result. A more specific alternate chemical method must be used in order to obtain a confirmed analytical result. Gas chromatography/mass spectrometry (GC/MS) is the preferred confirmatory method. Other chemical confirmation methods are available. Clinical consideration and professional judgement should be applied to any drug of abuse test result, particularly when preliminary positive results are used.    Positive results will be confirmed only at the physicians request. Unconfirmed screening results are to be used only for medical purposes (treatment).        CBC W/ AUTO DIFFERENTIAL    Narrative:     The following orders were created for panel order CBC auto differential.  Procedure                               Abnormality         Status                     ---------                               -----------         ------                     CBC with Differential[[id number]]       Abnormal            Final result                 Please view results for these tests on the individual orders.   EXTRA TUBES    Narrative:     The following orders were created for panel order EXTRA TUBES.  Procedure                               Abnormality         Status                     ---------                               -----------         ------                     Light Blue Top Hold[[id number]]                             In process                 Red Top Hold[[id number]]                                    In process                 Gold Top Hold[[id number]]                                   In process                   Please view results for these tests on the individual orders.   LIGHT BLUE TOP HOLD   RED TOP HOLD   GOLD TOP HOLD     EKG Readings: (Independently Interpreted)   Initial Reading: No STEMI. Previous EKG: Compared with most recent EKG Previous EKG Date: 04/21/2025. Rhythm: Normal Sinus  Rhythm. Heart Rate: 70.   Normal sinus rhythm   Rightward axis      ECG Results              EKG 12-lead (In process)        Collection Time Result Time QRS Duration [facility] QTC Calculation    04/26/25 16:13:55 04/26/25 16:18:46 100 427                     In process by Interface, Lab In [facility] (04/26/25 16:18:49)                   Narrative:    Test Reason : R56.9,    Vent. Rate :  70 BPM     Atrial Rate :  70 BPM     P-R Int : 156 ms          QRS Dur : 100 ms      QT Int : 396 ms       P-R-T Axes :  24  96  62 degrees    QTcB Int : 427 ms    Normal sinus rhythm  Rightward axis  Borderline Abnormal ECG  When compared with ECG of 21-Apr-2025 09:22,  No significant change was found    Referred By:            Confirmed By:                                   Imaging Results    None          Medications   sodium chloride 0.9% bolus 1,000 mL 1,000 mL (0 mLs Intravenous Stopped 4/26/25 1731)     Medical Decision Making  Differential:   Seizure disorder   Electrolyte abnormality   Substance abuse    Amount and/or Complexity of Data Reviewed  Labs: ordered.               ED Course as of 04/26/25 1740   Sat Apr 26, 2025   1732 On re-examination of the patient, he is resting quietly in the room.  Wakes up minimal verbal stimulation.  I have discussed with him and his family his previous ER visit results including him being positive for methamphetamines and how that can affect his history of seizure disorder.  Patient verbalizes that he no longer uses that substance.  I will be discharging the patient at this time with a referral into our internal medicine and neurology clinic to get established the [facility] and [facility].  Patient denies additional needs or concerns at this time.  I have stressed the need for him to continue to take his home medications as prescribed.  Patient is medically stable for discharge. [JA]      ED Course User Index  [JA] Anthony Domínguez Jr., FNP                       This chart is generated  using a voice recognition system. Grammatical and content areas may inadvertently be generated in error. Please contact me if you find a perceive any inappropriate information in this chart. Thank you.       Clinical Impression:  Final diagnoses:  [R56.9] Seizure (Primary)  [F19.11] History of substance abuse          ED Disposition Condition    Discharge Stable          ED Prescriptions    None       Follow-up Information       Follow up With Specialties Details Why Contact Info    [facility]  Schedule an appointment as soon as possible for a visit in 1 week Follow up with [facility] to obtain a PCP [address] [place] [phone]    [facility]  Schedule an appointment as soon as possible for a visit in 5 days For follow up with Neurology Clinic [address] [place] [phone]                 [1]   Social History  Tobacco Use    Smoking status: Never    Smokeless tobacco: Never   Substance Use Topics    Alcohol use: Not Currently    Drug use: Not Currently        Anthony Domínguez Jr., [facility]  04/26/25 2020

## 2025-04-28 LAB
OHS QRS DURATION: 100 MS
OHS QTC CALCULATION: 427 MS

## 2025-04-29 ENCOUNTER — TELEPHONE (OUTPATIENT)
Dept: EMERGENCY MEDICINE | Facility: HOSPITAL | Age: 25
End: 2025-04-29
Payer: MEDICAID

## 2025-04-29 DIAGNOSIS — G40.909 SEIZURE DISORDER: Primary | ICD-10-CM

## 2025-04-29 NOTE — TELEPHONE ENCOUNTER
Spoke with sister Jacqueline, states she has concerns patient has not seen a Neurologist is some time and his seizure activity has increased.  Request a referral sent to [facility] Neurology clinic, verified patient is active with PCP states he sees a provider in [place].  Stated she will be managing his appointments until health concerns are improved.